# Patient Record
Sex: FEMALE | Race: WHITE | NOT HISPANIC OR LATINO | Employment: PART TIME | URBAN - METROPOLITAN AREA
[De-identification: names, ages, dates, MRNs, and addresses within clinical notes are randomized per-mention and may not be internally consistent; named-entity substitution may affect disease eponyms.]

---

## 2017-01-28 ENCOUNTER — HOSPITAL ENCOUNTER (OUTPATIENT)
Dept: LAB | Facility: MEDICAL CENTER | Age: 42
End: 2017-01-28
Attending: OBSTETRICS & GYNECOLOGY
Payer: COMMERCIAL

## 2017-01-28 ENCOUNTER — HOSPITAL ENCOUNTER (OUTPATIENT)
Dept: LAB | Facility: MEDICAL CENTER | Age: 42
End: 2017-01-28
Attending: FAMILY MEDICINE
Payer: COMMERCIAL

## 2017-01-28 DIAGNOSIS — Z00.00 LABORATORY EXAMINATION ORDERED AS PART OF A ROUTINE GENERAL MEDICAL EXAMINATION: ICD-10-CM

## 2017-01-28 LAB
25(OH)D3 SERPL-MCNC: 27 NG/ML (ref 30–100)
ALBUMIN SERPL BCP-MCNC: 4.4 G/DL (ref 3.2–4.9)
ALBUMIN/GLOB SERPL: 1.5 G/DL
ALP SERPL-CCNC: 43 U/L (ref 30–99)
ALT SERPL-CCNC: 19 U/L (ref 2–50)
ANION GAP SERPL CALC-SCNC: 8 MMOL/L (ref 0–11.9)
AST SERPL-CCNC: 18 U/L (ref 12–45)
BILIRUB SERPL-MCNC: 0.4 MG/DL (ref 0.1–1.5)
BUN SERPL-MCNC: 20 MG/DL (ref 8–22)
CALCIUM SERPL-MCNC: 9.1 MG/DL (ref 8.5–10.5)
CHLORIDE SERPL-SCNC: 106 MMOL/L (ref 96–112)
CHOLEST SERPL-MCNC: 157 MG/DL (ref 100–199)
CO2 SERPL-SCNC: 24 MMOL/L (ref 20–33)
CREAT SERPL-MCNC: 0.78 MG/DL (ref 0.5–1.4)
GLOBULIN SER CALC-MCNC: 2.9 G/DL (ref 1.9–3.5)
GLUCOSE SERPL-MCNC: 83 MG/DL (ref 65–99)
HDLC SERPL-MCNC: 72 MG/DL
LDLC SERPL CALC-MCNC: 77 MG/DL
POTASSIUM SERPL-SCNC: 4.3 MMOL/L (ref 3.6–5.5)
PROT SERPL-MCNC: 7.3 G/DL (ref 6–8.2)
SODIUM SERPL-SCNC: 138 MMOL/L (ref 135–145)
T4 FREE SERPL-MCNC: 0.76 NG/DL (ref 0.53–1.43)
TRIGL SERPL-MCNC: 38 MG/DL (ref 0–149)
TSH SERPL DL<=0.005 MIU/L-ACNC: 1.39 UIU/ML (ref 0.3–3.7)

## 2017-01-28 PROCEDURE — 82306 VITAMIN D 25 HYDROXY: CPT

## 2017-01-28 PROCEDURE — 36415 COLL VENOUS BLD VENIPUNCTURE: CPT

## 2017-01-28 PROCEDURE — 80061 LIPID PANEL: CPT

## 2017-01-28 PROCEDURE — 80053 COMPREHEN METABOLIC PANEL: CPT

## 2017-01-28 PROCEDURE — 84443 ASSAY THYROID STIM HORMONE: CPT

## 2017-01-28 PROCEDURE — 84439 ASSAY OF FREE THYROXINE: CPT

## 2017-04-17 DIAGNOSIS — J30.1 SEASONAL ALLERGIC RHINITIS DUE TO POLLEN: ICD-10-CM

## 2017-04-17 RX ORDER — MOMETASONE FUROATE 50 UG/1
SPRAY, METERED NASAL
Qty: 1 INHALER | Refills: 2 | Status: SHIPPED | OUTPATIENT
Start: 2017-04-17 | End: 2017-10-15 | Stop reason: SDUPTHER

## 2017-04-25 ENCOUNTER — OFFICE VISIT (OUTPATIENT)
Dept: MEDICAL GROUP | Facility: CLINIC | Age: 42
End: 2017-04-25
Payer: COMMERCIAL

## 2017-04-25 VITALS
HEART RATE: 74 BPM | OXYGEN SATURATION: 94 % | SYSTOLIC BLOOD PRESSURE: 120 MMHG | RESPIRATION RATE: 16 BRPM | TEMPERATURE: 97.5 F | DIASTOLIC BLOOD PRESSURE: 80 MMHG | HEIGHT: 65 IN

## 2017-04-25 DIAGNOSIS — Z12.39 SCREENING BREAST EXAMINATION: ICD-10-CM

## 2017-04-25 DIAGNOSIS — M53.3 SACRAL BACK PAIN: ICD-10-CM

## 2017-04-25 DIAGNOSIS — J30.1 SEASONAL ALLERGIC RHINITIS DUE TO POLLEN: ICD-10-CM

## 2017-04-25 DIAGNOSIS — E55.9 VITAMIN D DEFICIENCY: ICD-10-CM

## 2017-04-25 DIAGNOSIS — F51.01 PRIMARY INSOMNIA: ICD-10-CM

## 2017-04-25 DIAGNOSIS — R59.9 SHOTTY LYMPH NODES: ICD-10-CM

## 2017-04-25 PROCEDURE — 99214 OFFICE O/P EST MOD 30 MIN: CPT | Performed by: FAMILY MEDICINE

## 2017-04-25 ASSESSMENT — PATIENT HEALTH QUESTIONNAIRE - PHQ9: CLINICAL INTERPRETATION OF PHQ2 SCORE: 1

## 2017-04-25 NOTE — MR AVS SNAPSHOT
"        Hilary Santos   2017 10:00 AM   Office Visit   MRN: [id number]    Department:  [facility]   Dept Phone:  [phone]    Description:  Female : 1975   Provider:  Trang Vasquez M.D.           Reason for Visit     Follow-Up           Allergies as of 2017     Allergen Noted Reactions    Codeine 12/15/2009   Nausea    Vicodin [Hydrocodone-Acetaminophen] 12/15/2009   Nausea      You were diagnosed with     Shotty lymph nodes   [8735216]       Screening breast examination   [401320]       Primary insomnia   [841634]       Sacral back pain   [456930]       Vitamin D deficiency   [2832043]       Seasonal allergic rhinitis due to pollen   [4912601]         Vital Signs     Blood Pressure Pulse Temperature Respirations Height Oxygen Saturation    120/80 mmHg 74 36.4 °C (97.5 °F) 16 1.651 m (5' 5\") 94%    Smoking Status                   Never Smoker            Basic Information     Date Of Birth Sex Race Ethnicity Preferred Language    1975 Female Unknown Non- English      Problem List              ICD-10-CM Priority Class Noted - Resolved    Seasonal allergies J30.2   Unknown - Present    Bilateral dry eyes H04.123   Unknown - Present    Insomnia G47.00   3/25/2016 - Present    Shotty lymph nodes R59.9   2017 - Present    Sacral back pain M53.3   2017 - Present    Vitamin D deficiency E55.9   Unknown - Present      Health Maintenance        Date Due Completion Dates    MAMMOGRAM 2016    PAP SMEAR 3/24/2020 3/24/2017 (Done), 2015 (Prv Comp), 2012 (N/S)    Override on 3/24/2017: Done (Dr. Harden)    Override on 2015: Previously completed    Override on 2012: (N/S) (dr hernandez)    IMM DTaP/Tdap/Td Vaccine (2 - Td) 2025            Current Immunizations     Tdap Vaccine 2015    Tetanus Vaccine 3/1/2004      Below and/or attached are the medications your provider expects you to take. Review all of your home medications " and newly ordered medications with your provider and/or pharmacist. Follow medication instructions as directed by your provider and/or pharmacist. Please keep your medication list with you and share with your provider. Update the information when medications are discontinued, doses are changed, or new medications (including over-the-counter products) are added; and carry medication information at all times in the event of emergency situations     Allergies:  CODEINE - Nausea     VICODIN - Nausea               Medications  Valid as of: April 25, 2017 - 12:29 PM    Generic Name Brand Name Tablet Size Instructions for use    ALPRAZolam (Tab) XANAX 0.5 MG Take 1 Tab by mouth 2 times a day as needed for Anxiety.        CycloSPORINE (Emulsion) RESTASIS 0.05 %         Mometasone Furoate (Suspension) NASONEX 50 MCG/ACT USE 2 SPRAYS IN NOSTRIL EVERY DAY AS DIRECTED        Naproxen (Tab) NAPROSYN 500 MG Take 1 Tab by mouth 2 times a day, with meals.        Triamcinolone Acetonide (Ointment) KENALOG 0.1 % Apply sparingly to affected area daily        .                 Medicines prescribed today were sent to:     [id number] - [place], [place] - [address]    [address] [facility] [address]    Phone: [phone] Fax: [phone]    Open 24 Hours?: No      Medication refill instructions:       If your prescription bottle indicates you have medication refills left, it is not necessary to call your provider’s office. Please contact your pharmacy and they will refill your medication.    If your prescription bottle indicates you do not have any refills left, you may request refills at any time through one of the following ways: The online WishLink system (except Urgent Care), by calling your provider’s office, or by asking your pharmacy to contact your provider’s office with a refill request. Medication refills are processed only during regular business hours and may not be available until the next business day. Your provider may  request additional information or to have a follow-up visit with you prior to refilling your medication.   *Please Note: Medication refills are assigned a new Rx number when refilled electronically. Your pharmacy may indicate that no refills were authorized even though a new prescription for the same medication is available at the pharmacy. Please request the medicine by name with the pharmacy before contacting your provider for a refill.        Your To Do List     Future Labs/Procedures Complete By Expires    CBC WITH DIFFERENTIAL  As directed 4/25/2018    MA-SCREEN MAMMO W/CAD-BILAT  As directed 4/25/2018         [employer] Access Code: Activation code not generated  Current [employer] Status: Active

## 2017-04-26 NOTE — PROGRESS NOTES
CC: Lymph node question, health maintenance, insomnia, allergies    HPI:   Hilary presents today with the following.    1. Shotty lymph nodes  Her gynecologist was concerned with some palpable lymph nodes in her pelvic region. This is a patient with chronic allergies. She denies any pelvic pain or vaginal discharge. She denies any fever or chills. She denies any diarrhea or change in her bowel habits. She denies any visible blood in the urine or in the stool.    2. Screening breast examination  She is due    3. Primary insomnia  This has been a long-standing problem. She has switched from any prescription medication to an over-the-counter preparation with melatonin and valerian root. Discussed pros and cons. Discussed that the prescription insomnia meds are not very good and have several problems. She might as well try the over-the-counter.    4. Sacral back pain  She has continued sacral back pain which she works on with exercises and stretching. She feels this is generally in good control though at times it is problematic.    5. Vitamin D deficiency  Discussed vitamin D repletion    6. Seasonal allergic rhinitis due to pollen  She has considerable congestion today and cough. She is using over-the-counter Benadryl but it is making her a little groggy the next day. Discussed colors and cons of various over-the-counter treatments including nasal steroids.      Patient Active Problem List    Diagnosis Date Noted   • Shotty lymph nodes 04/25/2017   • Sacral back pain 04/25/2017   • Vitamin D deficiency    • Insomnia 03/25/2016   • Bilateral dry eyes    • Seasonal allergies        Current Outpatient Prescriptions   Medication Sig Dispense Refill   • mometasone (NASONEX) 50 MCG/ACT nasal spray USE 2 SPRAYS IN NOSTRIL EVERY DAY AS DIRECTED 1 Inhaler 2   • alprazolam (XANAX) 0.5 MG Tab Take 1 Tab by mouth 2 times a day as needed for Anxiety. 10 Tab 0   • naproxen (NAPROSYN) 500 MG Tab Take 1 Tab by mouth 2 times a day, with  "meals. (Patient not taking: Reported on 5/31/2016) 14 Tab 0   • RESTASIS 0.05 % ophthalmic emulsion      • triamcinolone acetonide (KENALOG) 0.1 % OINT Apply sparingly to affected area daily (Patient not taking: Reported on 4/25/2017) 30 g 6     No current facility-administered medications for this visit.         Allergies as of 04/25/2017 - Wilbert as Reviewed 04/25/2017   Allergen Reaction Noted   • Codeine Nausea 12/15/2009   • Vicodin [hydrocodone-acetaminophen] Nausea 12/15/2009        ROS: As per HPI.    /80 mmHg  Pulse 74  Temp(Src) 36.4 °C (97.5 °F)  Resp 16  Ht 1.651 m (5' 5\")  SpO2 94%    Physical Exam:  Gen:         Alert and oriented, No apparent distress.  HEENT:   EOMI, nasal mucosa is somewhat swollen and boggy, slightly pale. No ulcerations or purulence appreciated.  Neck:       She has shotty cervical nodes, most prominent anteriorly. No neck mass or thyromegaly is appreciated.   Lungs:     Clear to auscultation bilaterally  CV:          Regular rate and rhythm. No murmurs, rubs or gallops.               Ext:          No clubbing, cyanosis, edema.  Lymph exam:  No axillary adenopathy, brachial adenopathy or anterior chest adenopathy is appreciated. There is shotty adenopathy appreciated in the pelvic region bilaterally in the groin crease. These nodes range from 3-4 mm. They are mobile, not fixed and rubbery.    Assessment and Plan.   41 y.o. female with the following issues.    1. Shotty lymph nodes  I believe her enlarged nodes represent allergy. None of the nodes felt pathologic. They were all mobile. Patient will continue to check once every 2 weeks. If the areas enlarge she will let us know.    2. Screening breast examination  Order discussed and placed  - MA-SCREEN MAMMO W/CAD-BILAT; Future    3. Primary insomnia  She will continue to try various over-the-counter therapies    4. Sacral back pain  She will continue stretching and back exercises. Handout on back exercises is " supplied.    5. Vitamin D deficiency  She is taking vitamin D, 800 units daily. Discussed that this is not likely to be sufficient. Discussed increasing to at least 1000 units with meals daily. Recommend 2000 units.    6. Seasonal allergic rhinitis due to pollen  Discussed various strategies including Allegra over-the-counter, saline rinses. Orders discussed  - CBC WITH DIFFERENTIAL; Future

## 2017-07-12 ENCOUNTER — HOSPITAL ENCOUNTER (OUTPATIENT)
Dept: LAB | Facility: MEDICAL CENTER | Age: 42
End: 2017-07-12
Attending: FAMILY MEDICINE
Payer: COMMERCIAL

## 2017-07-12 DIAGNOSIS — J30.1 SEASONAL ALLERGIC RHINITIS DUE TO POLLEN: ICD-10-CM

## 2017-07-12 LAB
BASOPHILS # BLD AUTO: 1.7 % (ref 0–1.8)
BASOPHILS # BLD: 0.07 K/UL (ref 0–0.12)
EOSINOPHIL # BLD AUTO: 0.16 K/UL (ref 0–0.51)
EOSINOPHIL NFR BLD: 3.8 % (ref 0–6.9)
ERYTHROCYTE [DISTWIDTH] IN BLOOD BY AUTOMATED COUNT: 51.5 FL (ref 35.9–50)
HCT VFR BLD AUTO: 38.3 % (ref 37–47)
HGB BLD-MCNC: 12 G/DL (ref 12–16)
IMM GRANULOCYTES # BLD AUTO: 0.01 K/UL (ref 0–0.11)
IMM GRANULOCYTES NFR BLD AUTO: 0.2 % (ref 0–0.9)
LYMPHOCYTES # BLD AUTO: 1.15 K/UL (ref 1–4.8)
LYMPHOCYTES NFR BLD: 27.3 % (ref 22–41)
MCH RBC QN AUTO: 27.1 PG (ref 27–33)
MCHC RBC AUTO-ENTMCNC: 31.3 G/DL (ref 33.6–35)
MCV RBC AUTO: 86.5 FL (ref 81.4–97.8)
MONOCYTES # BLD AUTO: 0.49 K/UL (ref 0–0.85)
MONOCYTES NFR BLD AUTO: 11.6 % (ref 0–13.4)
NEUTROPHILS # BLD AUTO: 2.33 K/UL (ref 2–7.15)
NEUTROPHILS NFR BLD: 55.4 % (ref 44–72)
NRBC # BLD AUTO: 0 K/UL
NRBC BLD AUTO-RTO: 0 /100 WBC
PLATELET # BLD AUTO: 344 K/UL (ref 164–446)
PMV BLD AUTO: 11.2 FL (ref 9–12.9)
RBC # BLD AUTO: 4.43 M/UL (ref 4.2–5.4)
WBC # BLD AUTO: 4.2 K/UL (ref 4.8–10.8)

## 2017-07-12 PROCEDURE — 85025 COMPLETE CBC W/AUTO DIFF WBC: CPT

## 2017-07-12 PROCEDURE — 36415 COLL VENOUS BLD VENIPUNCTURE: CPT

## 2017-09-13 ENCOUNTER — PATIENT MESSAGE (OUTPATIENT)
Dept: MEDICAL GROUP | Facility: CLINIC | Age: 42
End: 2017-09-13

## 2017-09-13 DIAGNOSIS — F40.243 FEAR OF FLYING: ICD-10-CM

## 2017-09-13 DIAGNOSIS — F41.9 INSOMNIA SECONDARY TO ANXIETY: ICD-10-CM

## 2017-09-13 DIAGNOSIS — F51.05 INSOMNIA SECONDARY TO ANXIETY: ICD-10-CM

## 2017-09-13 RX ORDER — ALPRAZOLAM 0.5 MG/1
0.5 TABLET ORAL 2 TIMES DAILY PRN
Qty: 15 TAB | Refills: 0 | Status: SHIPPED
Start: 2017-09-13 | End: 2018-07-17 | Stop reason: SDUPTHER

## 2017-09-13 RX ORDER — ALPRAZOLAM 0.5 MG/1
0.5 TABLET ORAL 2 TIMES DAILY PRN
Qty: 10 TAB | Refills: 0 | Status: CANCELLED | OUTPATIENT
Start: 2017-09-13

## 2017-09-13 NOTE — TELEPHONE ENCOUNTER
From: Hilary aSntos  Sent: 9/13/2017 12:08 PM PDT  Subject: Medication Renewal Request    Hilary Santos would like a refill of the following medications:  alprazolam (XANAX) 0.5 MG Tab [Trang Vasquez M.D.]    Preferred pharmacy: [id number] - [place], [place] - [address]    Comment:

## 2017-09-14 NOTE — TELEPHONE ENCOUNTER
From: Hilary Santos  To: Trang Vasquez M.D.  Sent: 9/13/2017 4:10 PM PDT  Subject: alprazolam prescription    Unfortunately they have not yet received this at [facility]     ----- Message -----  From: Trang Vasquez M.D.  Sent: 9/13/17 2:08 PM  To: Hilary Santos  Subject: alprazolam prescription    i have sent that to [facility] on [address].

## 2017-10-13 ENCOUNTER — PATIENT MESSAGE (OUTPATIENT)
Dept: MEDICAL GROUP | Facility: CLINIC | Age: 42
End: 2017-10-13

## 2017-10-15 DIAGNOSIS — J30.1 SEASONAL ALLERGIC RHINITIS DUE TO POLLEN: ICD-10-CM

## 2017-10-15 RX ORDER — MOMETASONE FUROATE 50 UG/1
SPRAY, METERED NASAL
Qty: 17 G | Refills: 2 | Status: SHIPPED | OUTPATIENT
Start: 2017-10-15 | End: 2018-06-01 | Stop reason: SDUPTHER

## 2017-10-20 ENCOUNTER — PATIENT MESSAGE (OUTPATIENT)
Dept: MEDICAL GROUP | Facility: CLINIC | Age: 42
End: 2017-10-20

## 2018-06-01 DIAGNOSIS — J30.1 SEASONAL ALLERGIC RHINITIS DUE TO POLLEN: ICD-10-CM

## 2018-06-02 RX ORDER — MOMETASONE FUROATE 50 UG/1
SPRAY, METERED NASAL
Qty: 17 G | Refills: 2 | Status: SHIPPED | OUTPATIENT
Start: 2018-06-02 | End: 2019-03-28 | Stop reason: SDUPTHER

## 2018-07-17 ENCOUNTER — OFFICE VISIT (OUTPATIENT)
Dept: MEDICAL GROUP | Facility: CLINIC | Age: 43
End: 2018-07-17
Payer: COMMERCIAL

## 2018-07-17 VITALS
HEART RATE: 84 BPM | RESPIRATION RATE: 13 BRPM | OXYGEN SATURATION: 96 % | HEIGHT: 65 IN | DIASTOLIC BLOOD PRESSURE: 84 MMHG | TEMPERATURE: 99.3 F | WEIGHT: 121 LBS | SYSTOLIC BLOOD PRESSURE: 112 MMHG | BODY MASS INDEX: 20.16 KG/M2

## 2018-07-17 DIAGNOSIS — F51.05 INSOMNIA SECONDARY TO ANXIETY: ICD-10-CM

## 2018-07-17 DIAGNOSIS — D17.1 LIPOMA OF BACK: ICD-10-CM

## 2018-07-17 DIAGNOSIS — M25.511 LOCALIZED PAIN OF RIGHT SHOULDER JOINT: ICD-10-CM

## 2018-07-17 DIAGNOSIS — H04.123 BILATERAL DRY EYES: ICD-10-CM

## 2018-07-17 DIAGNOSIS — F41.9 INSOMNIA SECONDARY TO ANXIETY: ICD-10-CM

## 2018-07-17 PROCEDURE — 99213 OFFICE O/P EST LOW 20 MIN: CPT | Performed by: FAMILY MEDICINE

## 2018-07-17 RX ORDER — ALPRAZOLAM 0.5 MG/1
0.5 TABLET ORAL 2 TIMES DAILY PRN
Qty: 15 TAB | Refills: 0 | Status: SHIPPED | OUTPATIENT
Start: 2018-07-17 | End: 2018-10-19 | Stop reason: SDUPTHER

## 2018-07-17 NOTE — PROGRESS NOTES
Chief Complaint   Patient presents with   • Shoulder Pain   • Mass   • Insomnia       Subjective:     HPI:   Hilary Santos presents today with the followin. Localized pain of right shoulder joint  She has now noticed the shoulder pain off and on for about 3 months.  This became much worse approximately 2 weeks ago when she was walking her dog and the dog started to run suddenly and jerked her arm.  The pain has not subsided.  She is very physically active and has strong musculature.  She notices now that and she is working out or is grabbing for something there is pain at the top of the shoulder sometimes in the posterior shoulder.  She also notes that the shoulder is popping and shifting.  On description she points to the AC joint as 1 of the sources of pain.  Discussed obtaining an x-ray.  Discussed also obtaining an MRI but we both agree that since no surgery is contemplated and the pain and overall discomfort is not serious enough to consider that we will hold off on that for now.  Discussed that physical therapy may be quite helpful.  I think she might be pushing her tendons a little too hard.    2. Lipoma of back  When trying to assess this shoulder issue she was looking in the mirror and noticed a lump on the back.  It is very mobile and soft but she wondered if it was an issue.  On examination it is a very classic appearing lipoma.  It is approximately 4 cm, uniformly soft and very mobile.  There is no change in the overlying skin.    3. Bilateral dry eyes  Patient has a history of dry eye.  She needs a renewal on her Restasis but cannot tell me what preparation she was using.  She will call her ophthalmology office and discuss this with them.  Denies any change in vision.    4. Insomnia secondary to anxiety  Patient does have occasionally insomnia secondary to anxiety.  She is having a very difficult situation family wise.  She does use alprazolam occasionally.  Per patient's history and review  "of the [facility] board of pharmacy interface it is seen that she feels this only rarely.  A prescription for 15 tablets is written and precautions discussed.        Patient Active Problem List    Diagnosis Date Noted   • Shotty lymph nodes 04/25/2017   • Sacral back pain 04/25/2017   • Vitamin D deficiency    • Insomnia 03/25/2016   • Bilateral dry eyes    • Seasonal allergies        Current medicines (including changes today)  Current Outpatient Prescriptions   Medication Sig Dispense Refill   • ALPRAZolam (XANAX) 0.5 MG Tab Take 1 Tab by mouth 2 times a day as needed for Sleep or Anxiety for up to 30 days. 15 Tab 0   • mometasone (NASONEX) 50 MCG/ACT nasal spray INHALE 2 SPRAYS IN EACH NOSTRIL ONCE DAILY AS DIRECTED 17 g 2   • RESTASIS 0.05 % ophthalmic emulsion        No current facility-administered medications for this visit.        Allergies   Allergen Reactions   • Codeine Nausea   • Vicodin [Hydrocodone-Acetaminophen] Nausea       ROS: As per HPI       Objective:     Blood pressure 112/84, pulse 84, temperature 37.4 °C (99.3 °F), resp. rate 13, height 1.651 m (5' 5\"), weight 54.9 kg (121 lb), SpO2 96 %. Body mass index is 20.14 kg/m².    Physical Exam:  Constitutional: Well-developed and well-nourished. Not diaphoretic. No distress. Lucid and fluent.  Skin: Skin is warm and dry. No rash noted. 4 cm mobile lipoma right scapular region.  No erythema or change in overlying skin noted.  Head: Atraumatic without lesions.  Eyes: Conjunctivae and extraocular motions are normal. Pupils are equal, round, and reactive to light. No scleral icterus.   Neck: Supple, trachea midline. No thyromegaly present. No cervical or supraclavicular lymphadenopathy. No JVD or carotid bruits appreciated  Cardiovascular: Regular rate and rhythm.  Normal S1, S2 without murmur appreciated.  Chest: Effort normal. Clear to auscultation throughout. No adventitious sounds.   Extremities: No cyanosis, clubbing, erythema, nor edema.  Right " shoulder some tenderness at the AC junction.  Some mild tenderness posteriorly.  No edema or erythema appreciated.  On motion of the shoulder there is certainly a popping and shifting sensation which is abnormal.  Neurological: Alert and oriented x 3.  Gait is normal.  Movements are fluent and symmetric and strong.  Psychiatric:  Behavior, mood, and affect are appropriate.       Assessment and Plan:     43 y.o. female with the following issues:    1. Localized pain of right shoulder joint  DX-SHOULDER 2+ RIGHT    REFERRAL TO PHYSICAL THERAPY Reason for Therapy: Eval/Treat/Report   2. Lipoma of back     3. Bilateral dry eyes     4. Insomnia secondary to anxiety  ALPRAZolam (XANAX) 0.5 MG Tab         Followup: Return if symptoms worsen or fail to improve.

## 2018-10-19 DIAGNOSIS — F51.05 INSOMNIA SECONDARY TO ANXIETY: ICD-10-CM

## 2018-10-19 DIAGNOSIS — F41.9 INSOMNIA SECONDARY TO ANXIETY: ICD-10-CM

## 2018-10-19 RX ORDER — ALPRAZOLAM 0.5 MG/1
0.5 TABLET ORAL 2 TIMES DAILY PRN
Qty: 10 TAB | Refills: 0 | Status: SHIPPED
Start: 2018-10-19 | End: 2018-10-22

## 2018-10-22 DIAGNOSIS — F40.243 FEAR OF FLYING: ICD-10-CM

## 2018-10-22 DIAGNOSIS — F41.8 SITUATIONAL ANXIETY: ICD-10-CM

## 2018-10-22 DIAGNOSIS — F51.04 PSYCHOPHYSIOLOGIC INSOMNIA: ICD-10-CM

## 2018-10-22 RX ORDER — ALPRAZOLAM 0.5 MG/1
0.5 TABLET ORAL 2 TIMES DAILY PRN
Qty: 25 TAB | Refills: 0 | Status: SHIPPED
Start: 2018-10-22 | End: 2018-11-21

## 2019-01-09 ENCOUNTER — NON-PROVIDER VISIT (OUTPATIENT)
Dept: MEDICAL GROUP | Facility: MEDICAL CENTER | Age: 44
End: 2019-01-09
Payer: COMMERCIAL

## 2019-01-09 DIAGNOSIS — Z23 NEED FOR IMMUNIZATION AGAINST INFLUENZA: ICD-10-CM

## 2019-01-09 PROCEDURE — 90686 IIV4 VACC NO PRSV 0.5 ML IM: CPT | Performed by: FAMILY MEDICINE

## 2019-01-09 PROCEDURE — 90471 IMMUNIZATION ADMIN: CPT | Performed by: FAMILY MEDICINE

## 2019-01-09 NOTE — NON-PROVIDER
"Hilary Santos is a 43 y.o. female here for a non-provider visit for:   FLU    Reason for immunization: Annual Flu Vaccine  Immunization records indicate need for vaccine: Yes, confirmed with Epic  Minimum interval has been met for this vaccine: Yes  ABN completed: No    Order and dose verified by:   VIS Dated  [id number] was given to patient: Yes  All IAC Questionnaire questions were answered \"No.\"    Patient tolerated injection and no adverse effects were observed or reported: Yes    Pt scheduled for next dose in series: No    "

## 2019-03-28 DIAGNOSIS — J30.1 SEASONAL ALLERGIC RHINITIS DUE TO POLLEN: ICD-10-CM

## 2019-03-28 RX ORDER — MOMETASONE FUROATE 50 UG/1
SPRAY, METERED NASAL
Qty: 17 G | Refills: 2 | Status: SHIPPED | OUTPATIENT
Start: 2019-03-28 | End: 2019-10-29 | Stop reason: SDUPTHER

## 2019-04-26 ENCOUNTER — OFFICE VISIT (OUTPATIENT)
Dept: MEDICAL GROUP | Facility: MEDICAL CENTER | Age: 44
End: 2019-04-26
Payer: COMMERCIAL

## 2019-04-26 ENCOUNTER — HOSPITAL ENCOUNTER (OUTPATIENT)
Dept: LAB | Facility: MEDICAL CENTER | Age: 44
End: 2019-04-26
Attending: FAMILY MEDICINE
Payer: COMMERCIAL

## 2019-04-26 VITALS
DIASTOLIC BLOOD PRESSURE: 84 MMHG | SYSTOLIC BLOOD PRESSURE: 124 MMHG | HEIGHT: 65 IN | OXYGEN SATURATION: 100 % | RESPIRATION RATE: 16 BRPM | TEMPERATURE: 98.6 F | HEART RATE: 66 BPM | BODY MASS INDEX: 22.33 KG/M2 | WEIGHT: 134 LBS

## 2019-04-26 DIAGNOSIS — R59.9 SHOTTY LYMPH NODES: ICD-10-CM

## 2019-04-26 DIAGNOSIS — Z20.828 EXPOSURE TO MONONUCLEOSIS SYNDROME: ICD-10-CM

## 2019-04-26 DIAGNOSIS — R19.7 DIARRHEA IN ADULT PATIENT: ICD-10-CM

## 2019-04-26 DIAGNOSIS — G56.03 CARPAL TUNNEL SYNDROME ON BOTH SIDES: ICD-10-CM

## 2019-04-26 DIAGNOSIS — R14.0 ABDOMINAL BLOATING: ICD-10-CM

## 2019-04-26 DIAGNOSIS — Z91.018 MULTIPLE FOOD ALLERGIES: ICD-10-CM

## 2019-04-26 DIAGNOSIS — Z12.39 BREAST CANCER SCREENING: ICD-10-CM

## 2019-04-26 DIAGNOSIS — H91.93 BILATERAL HEARING LOSS, UNSPECIFIED HEARING LOSS TYPE: ICD-10-CM

## 2019-04-26 LAB
ALBUMIN SERPL BCP-MCNC: 4.1 G/DL (ref 3.2–4.9)
ALBUMIN/GLOB SERPL: 1.5 G/DL
ALP SERPL-CCNC: 62 U/L (ref 30–99)
ALT SERPL-CCNC: 20 U/L (ref 2–50)
ANION GAP SERPL CALC-SCNC: 5 MMOL/L (ref 0–11.9)
AST SERPL-CCNC: 24 U/L (ref 12–45)
BASOPHILS # BLD AUTO: 1.9 % (ref 0–1.8)
BASOPHILS # BLD: 0.1 K/UL (ref 0–0.12)
BILIRUB SERPL-MCNC: 0.2 MG/DL (ref 0.1–1.5)
BUN SERPL-MCNC: 13 MG/DL (ref 8–22)
CALCIUM SERPL-MCNC: 9.1 MG/DL (ref 8.5–10.5)
CHLORIDE SERPL-SCNC: 106 MMOL/L (ref 96–112)
CO2 SERPL-SCNC: 26 MMOL/L (ref 20–33)
COMMENT 1642: NORMAL
CREAT SERPL-MCNC: 0.81 MG/DL (ref 0.5–1.4)
EOSINOPHIL # BLD AUTO: 0.06 K/UL (ref 0–0.51)
EOSINOPHIL NFR BLD: 1.1 % (ref 0–6.9)
ERYTHROCYTE [DISTWIDTH] IN BLOOD BY AUTOMATED COUNT: 53.3 FL (ref 35.9–50)
GLOBULIN SER CALC-MCNC: 2.7 G/DL (ref 1.9–3.5)
GLUCOSE SERPL-MCNC: 100 MG/DL (ref 65–99)
HCT VFR BLD AUTO: 35.8 % (ref 37–47)
HETEROPH AB SER QL: NEGATIVE
HGB BLD-MCNC: 10.7 G/DL (ref 12–16)
IMM GRANULOCYTES # BLD AUTO: 0.01 K/UL (ref 0–0.11)
IMM GRANULOCYTES NFR BLD AUTO: 0.2 % (ref 0–0.9)
LYMPHOCYTES # BLD AUTO: 1.51 K/UL (ref 1–4.8)
LYMPHOCYTES NFR BLD: 28.3 % (ref 22–41)
MCH RBC QN AUTO: 24.9 PG (ref 27–33)
MCHC RBC AUTO-ENTMCNC: 29.9 G/DL (ref 33.6–35)
MCV RBC AUTO: 83.4 FL (ref 81.4–97.8)
MONOCYTES # BLD AUTO: 0.58 K/UL (ref 0–0.85)
MONOCYTES NFR BLD AUTO: 10.9 % (ref 0–13.4)
MORPHOLOGY BLD-IMP: NORMAL
NEUTROPHILS # BLD AUTO: 3.07 K/UL (ref 2–7.15)
NEUTROPHILS NFR BLD: 57.6 % (ref 44–72)
NRBC # BLD AUTO: 0 K/UL
NRBC BLD-RTO: 0 /100 WBC
OVALOCYTES BLD QL SMEAR: NORMAL
PLATELET # BLD AUTO: 391 K/UL (ref 164–446)
PLATELET BLD QL SMEAR: NORMAL
PMV BLD AUTO: 9.9 FL (ref 9–12.9)
POIKILOCYTOSIS BLD QL SMEAR: NORMAL
POTASSIUM SERPL-SCNC: 3.8 MMOL/L (ref 3.6–5.5)
PROT SERPL-MCNC: 6.8 G/DL (ref 6–8.2)
RBC # BLD AUTO: 4.29 M/UL (ref 4.2–5.4)
RBC BLD AUTO: PRESENT
SODIUM SERPL-SCNC: 137 MMOL/L (ref 135–145)
WBC # BLD AUTO: 5.3 K/UL (ref 4.8–10.8)

## 2019-04-26 PROCEDURE — 36415 COLL VENOUS BLD VENIPUNCTURE: CPT

## 2019-04-26 PROCEDURE — 86308 HETEROPHILE ANTIBODY SCREEN: CPT

## 2019-04-26 PROCEDURE — 80053 COMPREHEN METABOLIC PANEL: CPT

## 2019-04-26 PROCEDURE — 99214 OFFICE O/P EST MOD 30 MIN: CPT | Performed by: FAMILY MEDICINE

## 2019-04-26 PROCEDURE — 85025 COMPLETE CBC W/AUTO DIFF WBC: CPT

## 2019-04-26 ASSESSMENT — PATIENT HEALTH QUESTIONNAIRE - PHQ9: CLINICAL INTERPRETATION OF PHQ2 SCORE: 0

## 2019-04-26 NOTE — PROGRESS NOTES
Chief Complaint   Patient presents with   • Hearing Problem   • Wrist Pain   • Bloated   • Diarrhea       Subjective:     HPI:   Hilary Santos presents today with the followin. Bilateral hearing loss, unspecified hearing loss type  Patient had significant noise exposure in her 20s.  She has noted diminished hearing.  Her family notes this too.  Referral for evaluation and treatment discussed and placed    2. Breast cancer screening  Discussed, mammogram order placed    3. Carpal tunnel syndrome on both sides  Patient is having typical symptoms, worse on the right.  They are worse at the end of a days work and at night.  Discussed options.  She would like to avoid surgery.  Discussed starting with soft carpal tunnel braces at night.    4. Multiple food allergies  Patient has tried to identfiy food allergies that are giving her abdominal problems.  Finds she does have problems with lactose and gluten but has not been able to identify other.  Discussed a trial of the whole 30 diet for at least 6 weeks.  Says she will do that.    5. Abdominal bloating/Diarrhea in adult patient  She has abdominal bloating and urgent diarrhea, worse with stress but sometimes unprovoked.  Denies blood in the stool.  GI referral discussed and placed and this has been going on several years.  Lab orders discussed and placed    7. Shotty lymph nodes  Patient has allergies and palpable shotty nodes.  Lab orders discussed and placed    8. Exposure to mononucleosis syndrome   diagnosed with mono with confirmatory testing.  She is feeling generally unwell.  Lab orders discussed and placed.        Patient Active Problem List    Diagnosis Date Noted   • Carpal tunnel syndrome on both sides 2019   • Situational anxiety 10/22/2018   • Fear of flying 10/22/2018   • Shotty lymph nodes 2017   • Sacral back pain 2017   • Vitamin D deficiency    • Psychophysiologic insomnia 2016   • Bilateral dry eyes    •  "Seasonal allergies        Current medicines (including changes today)  Current Outpatient Prescriptions   Medication Sig Dispense Refill   • mometasone (NASONEX) 50 MCG/ACT nasal spray INHALE 2 SPRAYS IN EACH NOSTRIL ONCE DAILY AS DIRECTED 17 g 2   • RESTASIS 0.05 % ophthalmic emulsion        No current facility-administered medications for this visit.        Allergies   Allergen Reactions   • Codeine Nausea   • Vicodin [Hydrocodone-Acetaminophen] Nausea       ROS: As per HPI       Objective:     /84   Pulse 66   Temp 37 °C (98.6 °F)   Resp 16   Ht 1.651 m (5' 5\")   Wt 60.8 kg (134 lb)   SpO2 100%  Body mass index is 22.3 kg/m².    Physical Exam:  Constitutional: Well-developed and well-nourished. Not diaphoretic. No distress. Lucid and fluent.  Skin: Skin is warm and dry. No rash noted.  Head: Atraumatic without lesions.  Eyes: Conjunctivae and extraocular motions are normal. Pupils are equal, round, and reactive to light. No scleral icterus.   Ears:  External ears unremarkable. Tympanic membranes clear and intact.  Nose: Nares patent. Mucosa without edema or erythema. No discharge. No facial tenderness.  Mouth/Throat: Tongue normal. Oropharynx is clear and moist. Posterior pharynx without erythema or exudates.  Neck: Supple, trachea midline. No thyromegaly present. No cervical or supraclavicular lymphadenopathy. No JVD or carotid bruits appreciated  Cardiovascular: Regular rate and rhythm.  Normal S1, S2 without murmur appreciated.  Chest: Effort normal. Clear to auscultation throughout. No adventitious sounds.   Abdomen: Soft, epigastrium and sigmoid region tender, and without distention. Active bowel sounds in all four quadrants. No rebound, guarding, masses or hepatosplenomegaly.  Extremities: No cyanosis, clubbing, erythema, nor edema.   Neurological: Alert and oriented x 3. DTRs 2+/3 and symmetric.  Psychiatric:  Behavior, mood, and affect are appropriate.       Assessment and Plan:     43 y.o. " female with the following issues:    1. Bilateral hearing loss, unspecified hearing loss type  REFERRAL TO ENT   2. Breast cancer screening  MA-SCREENING MAMMO BILAT W/TOMOSYNTHESIS W/CAD   3. Carpal tunnel syndrome on both sides     4. Multiple food allergies     5. Abdominal bloating  REFERRAL TO GASTROENTEROLOGY    CBC WITH DIFFERENTIAL    MONONUCLEOSIS TEST QUAL    Comp Metabolic Panel   6. Diarrhea in adult patient  REFERRAL TO GASTROENTEROLOGY    CBC WITH DIFFERENTIAL    Comp Metabolic Panel   7. Shotty lymph nodes  CBC WITH DIFFERENTIAL    MONONUCLEOSIS TEST QUAL   8. Exposure to mononucleosis syndrome  MONONUCLEOSIS TEST QUAL         Followup: Return in about 4 months (around 8/26/2019), or if symptoms worsen or fail to improve.

## 2019-04-30 ENCOUNTER — TELEPHONE (OUTPATIENT)
Dept: MEDICAL GROUP | Facility: MEDICAL CENTER | Age: 44
End: 2019-04-30

## 2019-05-01 ENCOUNTER — TELEPHONE (OUTPATIENT)
Dept: MEDICAL GROUP | Facility: MEDICAL CENTER | Age: 44
End: 2019-05-01

## 2019-05-01 NOTE — TELEPHONE ENCOUNTER
The primary result is actually anemia.  The rest of the test results are not particularly significant.  Needs to start iron supplementation.  We should also recheck the blood count after 2 months.

## 2019-07-15 ENCOUNTER — HOSPITAL ENCOUNTER (OUTPATIENT)
Dept: RADIOLOGY | Facility: MEDICAL CENTER | Age: 44
End: 2019-07-15
Attending: FAMILY MEDICINE
Payer: COMMERCIAL

## 2019-07-15 DIAGNOSIS — Z12.39 BREAST CANCER SCREENING: ICD-10-CM

## 2019-07-15 PROCEDURE — 77063 BREAST TOMOSYNTHESIS BI: CPT

## 2019-07-25 DIAGNOSIS — D64.9 ANEMIA, UNSPECIFIED TYPE: ICD-10-CM

## 2019-07-25 NOTE — PROGRESS NOTES
Lab orders placed to follow-up on anemia.  We do not have a causation though iron deficiency has been assumed.  I am included an iron and B12 level

## 2019-07-29 ENCOUNTER — HOSPITAL ENCOUNTER (OUTPATIENT)
Dept: LAB | Facility: MEDICAL CENTER | Age: 44
End: 2019-07-29
Attending: FAMILY MEDICINE
Payer: COMMERCIAL

## 2019-07-29 DIAGNOSIS — D64.9 ANEMIA, UNSPECIFIED TYPE: ICD-10-CM

## 2019-07-29 LAB
ERYTHROCYTE [DISTWIDTH] IN BLOOD BY AUTOMATED COUNT: 60.3 FL (ref 35.9–50)
HCT VFR BLD AUTO: 43.1 % (ref 37–47)
HGB BLD-MCNC: 14 G/DL (ref 12–16)
IRON SATN MFR SERPL: 15 % (ref 15–55)
IRON SERPL-MCNC: 59 UG/DL (ref 40–170)
MCH RBC QN AUTO: 30.6 PG (ref 27–33)
MCHC RBC AUTO-ENTMCNC: 32.5 G/DL (ref 33.6–35)
MCV RBC AUTO: 94.3 FL (ref 81.4–97.8)
PLATELET # BLD AUTO: 242 K/UL (ref 164–446)
PMV BLD AUTO: 11.2 FL (ref 9–12.9)
RBC # BLD AUTO: 4.57 M/UL (ref 4.2–5.4)
TIBC SERPL-MCNC: 386 UG/DL (ref 250–450)
WBC # BLD AUTO: 5 K/UL (ref 4.8–10.8)

## 2019-07-29 PROCEDURE — 36415 COLL VENOUS BLD VENIPUNCTURE: CPT

## 2019-07-29 PROCEDURE — 83540 ASSAY OF IRON: CPT

## 2019-07-29 PROCEDURE — 85027 COMPLETE CBC AUTOMATED: CPT

## 2019-07-29 PROCEDURE — 82607 VITAMIN B-12: CPT

## 2019-07-29 PROCEDURE — 83550 IRON BINDING TEST: CPT

## 2019-07-30 LAB — VIT B12 SERPL-MCNC: 515 PG/ML (ref 211–911)

## 2019-10-29 DIAGNOSIS — J30.1 SEASONAL ALLERGIC RHINITIS DUE TO POLLEN: ICD-10-CM

## 2019-10-29 RX ORDER — MOMETASONE FUROATE 50 UG/1
SPRAY, METERED NASAL
Qty: 17 G | Refills: 2 | Status: SHIPPED | OUTPATIENT
Start: 2019-10-29 | End: 2020-08-31

## 2019-12-13 ENCOUNTER — NON-PROVIDER VISIT (OUTPATIENT)
Dept: MEDICAL GROUP | Facility: MEDICAL CENTER | Age: 44
End: 2019-12-13
Payer: COMMERCIAL

## 2019-12-13 DIAGNOSIS — Z23 NEED FOR VACCINATION: ICD-10-CM

## 2019-12-13 PROCEDURE — 90471 IMMUNIZATION ADMIN: CPT | Performed by: FAMILY MEDICINE

## 2019-12-13 PROCEDURE — 90686 IIV4 VACC NO PRSV 0.5 ML IM: CPT | Performed by: FAMILY MEDICINE

## 2019-12-14 NOTE — NON-PROVIDER
"Hilary Santos is a 44 y.o. female here for a non-provider visit for:   FLU    Reason for immunization: Annual Flu Vaccine  Immunization records indicate need for vaccine: Yes, confirmed with Epic  Minimum interval has been met for this vaccine: Yes  ABN completed: Yes    Order and dose verified by: KATIA  VIS Dated  8/15/19 was given to patient: Yes  All IAC Questionnaire questions were answered \"No.\"    Patient tolerated injection and no adverse effects were observed or reported: Yes    Pt scheduled for next dose in series: No        "

## 2020-07-22 DIAGNOSIS — R14.0 ABDOMINAL BLOATING: ICD-10-CM

## 2020-07-22 DIAGNOSIS — E55.9 VITAMIN D DEFICIENCY: ICD-10-CM

## 2020-07-22 DIAGNOSIS — Z13.220 SCREENING CHOLESTEROL LEVEL: ICD-10-CM

## 2020-07-22 DIAGNOSIS — R73.09 ELEVATED GLUCOSE: ICD-10-CM

## 2020-07-22 DIAGNOSIS — D64.9 ANEMIA, UNSPECIFIED TYPE: ICD-10-CM

## 2020-07-22 DIAGNOSIS — N92.0 MENORRHAGIA WITH REGULAR CYCLE: ICD-10-CM

## 2020-07-27 ENCOUNTER — HOSPITAL ENCOUNTER (OUTPATIENT)
Dept: LAB | Facility: MEDICAL CENTER | Age: 45
End: 2020-07-27
Attending: FAMILY MEDICINE
Payer: COMMERCIAL

## 2020-07-27 DIAGNOSIS — R14.0 ABDOMINAL BLOATING: ICD-10-CM

## 2020-07-27 DIAGNOSIS — E55.9 VITAMIN D DEFICIENCY: ICD-10-CM

## 2020-07-27 DIAGNOSIS — R73.09 ELEVATED GLUCOSE: ICD-10-CM

## 2020-07-27 DIAGNOSIS — N92.0 MENORRHAGIA WITH REGULAR CYCLE: ICD-10-CM

## 2020-07-27 DIAGNOSIS — Z13.220 SCREENING CHOLESTEROL LEVEL: ICD-10-CM

## 2020-07-27 LAB
25(OH)D3 SERPL-MCNC: 38 NG/ML (ref 30–100)
ALBUMIN SERPL BCP-MCNC: 4.4 G/DL (ref 3.2–4.9)
ALBUMIN/GLOB SERPL: 1.6 G/DL
ALP SERPL-CCNC: 60 U/L (ref 30–99)
ALT SERPL-CCNC: 10 U/L (ref 2–50)
ANION GAP SERPL CALC-SCNC: 12 MMOL/L (ref 7–16)
AST SERPL-CCNC: 20 U/L (ref 12–45)
BILIRUB SERPL-MCNC: 0.4 MG/DL (ref 0.1–1.5)
BUN SERPL-MCNC: 11 MG/DL (ref 8–22)
CALCIUM SERPL-MCNC: 9 MG/DL (ref 8.5–10.5)
CHLORIDE SERPL-SCNC: 106 MMOL/L (ref 96–112)
CHOLEST SERPL-MCNC: 151 MG/DL (ref 100–199)
CO2 SERPL-SCNC: 21 MMOL/L (ref 20–33)
CREAT SERPL-MCNC: 0.71 MG/DL (ref 0.5–1.4)
ERYTHROCYTE [DISTWIDTH] IN BLOOD BY AUTOMATED COUNT: 50.8 FL (ref 35.9–50)
EST. AVERAGE GLUCOSE BLD GHB EST-MCNC: 111 MG/DL
FASTING STATUS PATIENT QL REPORTED: NORMAL
GLOBULIN SER CALC-MCNC: 2.7 G/DL (ref 1.9–3.5)
GLUCOSE SERPL-MCNC: 93 MG/DL (ref 65–99)
HBA1C MFR BLD: 5.5 % (ref 0–5.6)
HCT VFR BLD AUTO: 34 % (ref 37–47)
HDLC SERPL-MCNC: 77 MG/DL
HGB BLD-MCNC: 10.3 G/DL (ref 12–16)
LDLC SERPL CALC-MCNC: 68 MG/DL
MCH RBC QN AUTO: 25 PG (ref 27–33)
MCHC RBC AUTO-ENTMCNC: 30.3 G/DL (ref 33.6–35)
MCV RBC AUTO: 82.5 FL (ref 81.4–97.8)
PLATELET # BLD AUTO: 373 K/UL (ref 164–446)
PMV BLD AUTO: 10.4 FL (ref 9–12.9)
POTASSIUM SERPL-SCNC: 4.7 MMOL/L (ref 3.6–5.5)
PROT SERPL-MCNC: 7.1 G/DL (ref 6–8.2)
RBC # BLD AUTO: 4.12 M/UL (ref 4.2–5.4)
SODIUM SERPL-SCNC: 139 MMOL/L (ref 135–145)
TRIGL SERPL-MCNC: 32 MG/DL (ref 0–149)
WBC # BLD AUTO: 4 K/UL (ref 4.8–10.8)

## 2020-07-27 PROCEDURE — 80053 COMPREHEN METABOLIC PANEL: CPT

## 2020-07-27 PROCEDURE — 82306 VITAMIN D 25 HYDROXY: CPT

## 2020-07-27 PROCEDURE — 36415 COLL VENOUS BLD VENIPUNCTURE: CPT

## 2020-07-27 PROCEDURE — 85027 COMPLETE CBC AUTOMATED: CPT

## 2020-07-27 PROCEDURE — 80061 LIPID PANEL: CPT

## 2020-07-27 PROCEDURE — 83036 HEMOGLOBIN GLYCOSYLATED A1C: CPT

## 2020-07-30 ENCOUNTER — TELEMEDICINE (OUTPATIENT)
Dept: MEDICAL GROUP | Facility: MEDICAL CENTER | Age: 45
End: 2020-07-30
Payer: COMMERCIAL

## 2020-07-30 VITALS
SYSTOLIC BLOOD PRESSURE: 138 MMHG | RESPIRATION RATE: 16 BRPM | HEART RATE: 58 BPM | BODY MASS INDEX: 22.99 KG/M2 | TEMPERATURE: 97.6 F | DIASTOLIC BLOOD PRESSURE: 82 MMHG | HEIGHT: 65 IN | WEIGHT: 138 LBS

## 2020-07-30 DIAGNOSIS — R14.0 ABDOMINAL BLOATING: ICD-10-CM

## 2020-07-30 DIAGNOSIS — R19.7 DIARRHEA IN ADULT PATIENT: ICD-10-CM

## 2020-07-30 DIAGNOSIS — D50.0 BLOOD LOSS ANEMIA: ICD-10-CM

## 2020-07-30 DIAGNOSIS — N92.0 MENORRHAGIA WITH REGULAR CYCLE: ICD-10-CM

## 2020-07-30 DIAGNOSIS — Z12.39 SCREENING BREAST EXAMINATION: ICD-10-CM

## 2020-07-30 PROCEDURE — 99214 OFFICE O/P EST MOD 30 MIN: CPT | Mod: 95,CR | Performed by: FAMILY MEDICINE

## 2020-07-30 ASSESSMENT — FIBROSIS 4 INDEX: FIB4 SCORE: 0.75

## 2020-07-30 NOTE — PROGRESS NOTES
This encounter was conducted via Zoom meeting  Verbal consent was obtained. Patient's identity was verified.       CC:irregular bleeding, anemia                                                                                                                               HPI:   Hilary presents today with the following.    1. Menorrhagia with regular cycle  Patient is having generally regular cycles but her bleeding has been significant and excessive.  She is taking some iron intermittently but is feeling very tired when she is having her menses.  She is due for a Pap soon.  Discussed seeing gynecology for her Pap and also to discuss her heavy bleeding.  Referral is placed.    2. Blood loss anemia  No other sources of blood loss noted.  Denies visible blood in stool or urine.  In the past iron supplementation helped in the past    3. Screening breast examination  Mammogram order discussed and placed    4. Diarrhea in adult patient  She does continue to have loose urgent stool and abdominal bloating.  Referral was placed in the past but the pandemic has intervened.  Referral is renewed.  Denies any visible blood.  - REFERRAL TO GASTROENTEROLOGY    5. Abdominal bloating  Patient is having episodic bloating, often triggered by food.  She has been told in the past this is likely IBS.  She does need further evaluation and treatment with gastroenterology.  Referral is renewed.  - REFERRAL TO GASTROENTEROLOGY      Patient Active Problem List    Diagnosis Date Noted   • Carpal tunnel syndrome on both sides 04/26/2019   • Situational anxiety 10/22/2018   • Fear of flying 10/22/2018   • Shotty lymph nodes 04/25/2017   • Sacral back pain 04/25/2017   • Vitamin D deficiency    • Psychophysiologic insomnia 03/25/2016   • Bilateral dry eyes    • Seasonal allergies        Current Outpatient Medications   Medication Sig Dispense Refill   • mometasone (NASONEX) 50 MCG/ACT nasal spray INHALE 2 SPRAYS IN EACH NOSTRIL ONCE DAILY AS  "DIRECTED 17 g 2   • RESTASIS 0.05 % ophthalmic emulsion        No current facility-administered medications for this visit.          Allergies as of 07/30/2020 - Reviewed 07/30/2020   Allergen Reaction Noted   • Codeine Nausea 12/15/2009   • Vicodin [hydrocodone-acetaminophen] Nausea 12/15/2009        ROS:  Denies, chest pain, Shortness of breath, Edema.     /82   Pulse (!) 58   Temp 36.4 °C (97.6 °F)   Resp 16   Ht 1.651 m (5' 5\")   Wt 62.6 kg (138 lb)   BMI 22.96 kg/m²       Physical Exam:  Constitutional: Alert, no distress, well-groomed.  Skin: No rashes in visible areas.  Eye: Round. Conjunctiva clear, No icterus.   ENMT: Lips pink without lesions, good dentition, moist mucous membranes. Phonation normal.  Neck: No masses, no thyromegaly. Moves freely without pain.  CV: Pulse as reported by patient  Respiratory: Unlabored respiratory effort, no cough or audible wheeze  Psych: Alert and oriented x3, normal affect and mood.      Lab results reviewed and discussed.  She is anemic.      Assessment and Plan.   45 y.o. female with the following issues.    1. Menorrhagia with regular cycle  Patient has heavy though regular menstrual cycles.  Patient has been attributing this to being perimenopausal but I do believe we need an ultrasound to determine whether there is a structural change or thickened endometrium.  She agrees to this testing.  She also agrees to referral to OB/GYN.  She is due for her Pap as well.  - US-PELVIC TRANSVAGINAL ONLY; Future  - REFERRAL TO OB/GYN    2. Blood loss anemia  No other source has been found for her anemia.  No visible blood in stool or urine.  No epistaxis.  No hematemesis.  Ultrasound order discussed and placed.  The anemia is likely due to the blood loss associated with her heavy periods  - US-PELVIC TRANSVAGINAL ONLY; Future  - IRON/TOTAL IRON BIND; Future  - REFERRAL TO OB/GYN    3. Screening breast examination  Mammogram order discussed and placed  - MA DIAGNOSTIC " MAMMO BILAT W/CAD; Future    4. Diarrhea in adult patient  Referral discussed and placed  - REFERRAL TO GASTROENTEROLOGY    5. Abdominal bloating  Referral discussed and placed  - REFERRAL TO GASTROENTEROLOGY    Follow up 6 weeks, sooner as needed

## 2020-08-30 DIAGNOSIS — J30.1 SEASONAL ALLERGIC RHINITIS DUE TO POLLEN: ICD-10-CM

## 2020-08-31 ENCOUNTER — HOSPITAL ENCOUNTER (OUTPATIENT)
Dept: RADIOLOGY | Facility: MEDICAL CENTER | Age: 45
End: 2020-08-31
Attending: FAMILY MEDICINE
Payer: COMMERCIAL

## 2020-08-31 DIAGNOSIS — D50.0 BLOOD LOSS ANEMIA: ICD-10-CM

## 2020-08-31 DIAGNOSIS — N92.0 MENORRHAGIA WITH REGULAR CYCLE: ICD-10-CM

## 2020-08-31 PROCEDURE — 76830 TRANSVAGINAL US NON-OB: CPT

## 2020-08-31 RX ORDER — MOMETASONE FUROATE 50 UG/1
SPRAY, METERED NASAL
Qty: 17 G | Refills: 2 | Status: SHIPPED | OUTPATIENT
Start: 2020-08-31 | End: 2021-02-25

## 2020-10-06 DIAGNOSIS — N95.1 PERIMENOPAUSAL SYMPTOMS: ICD-10-CM

## 2020-10-08 ENCOUNTER — HOSPITAL ENCOUNTER (OUTPATIENT)
Dept: LAB | Facility: MEDICAL CENTER | Age: 45
End: 2020-10-08
Attending: INTERNAL MEDICINE
Payer: COMMERCIAL

## 2020-10-08 LAB
FERRITIN SERPL-MCNC: 4.7 NG/ML (ref 10–291)
IRON SATN MFR SERPL: 6 % (ref 15–55)
IRON SERPL-MCNC: 27 UG/DL (ref 40–170)
TIBC SERPL-MCNC: 418 UG/DL (ref 250–450)
UIBC SERPL-MCNC: 391 UG/DL (ref 110–370)

## 2020-10-08 PROCEDURE — 82728 ASSAY OF FERRITIN: CPT

## 2020-10-08 PROCEDURE — 83540 ASSAY OF IRON: CPT

## 2020-10-08 PROCEDURE — 36415 COLL VENOUS BLD VENIPUNCTURE: CPT

## 2020-10-08 PROCEDURE — 83550 IRON BINDING TEST: CPT

## 2021-01-25 DIAGNOSIS — R19.7 DIARRHEA IN ADULT PATIENT: ICD-10-CM

## 2021-01-25 DIAGNOSIS — G56.03 CARPAL TUNNEL SYNDROME ON BOTH SIDES: ICD-10-CM

## 2021-01-25 DIAGNOSIS — N95.1 PERIMENOPAUSAL SYMPTOMS: ICD-10-CM

## 2021-01-25 NOTE — PROGRESS NOTES
TPO antibody test has been recommended.  She is working with holistic practitioner who has recommended an iodine treatment called Amanda.  I have not familiar with that but a TPO antibody test is recommended.  I think that is not unreasonable and I have placed that order.  Patient is having increasing symptoms some of which might be related to her menopause.

## 2021-01-26 ENCOUNTER — HOSPITAL ENCOUNTER (OUTPATIENT)
Dept: LAB | Facility: MEDICAL CENTER | Age: 46
End: 2021-01-26
Attending: FAMILY MEDICINE
Payer: COMMERCIAL

## 2021-01-26 ENCOUNTER — HOSPITAL ENCOUNTER (OUTPATIENT)
Dept: LAB | Facility: MEDICAL CENTER | Age: 46
End: 2021-01-26
Attending: INTERNAL MEDICINE
Payer: COMMERCIAL

## 2021-01-26 DIAGNOSIS — N95.1 PERIMENOPAUSAL SYMPTOMS: ICD-10-CM

## 2021-01-26 DIAGNOSIS — G56.03 CARPAL TUNNEL SYNDROME ON BOTH SIDES: ICD-10-CM

## 2021-01-26 DIAGNOSIS — D50.0 BLOOD LOSS ANEMIA: ICD-10-CM

## 2021-01-26 DIAGNOSIS — R19.7 DIARRHEA IN ADULT PATIENT: ICD-10-CM

## 2021-01-26 LAB
BASOPHILS # BLD AUTO: 2 % (ref 0–1.8)
BASOPHILS # BLD: 0.06 K/UL (ref 0–0.12)
EOSINOPHIL # BLD AUTO: 0.06 K/UL (ref 0–0.51)
EOSINOPHIL NFR BLD: 2 % (ref 0–6.9)
ERYTHROCYTE [DISTWIDTH] IN BLOOD BY AUTOMATED COUNT: 58.8 FL (ref 35.9–50)
FERRITIN SERPL-MCNC: 5 NG/ML (ref 10–291)
HCT VFR BLD AUTO: 40.8 % (ref 37–47)
HGB BLD-MCNC: 13 G/DL (ref 12–16)
IMM GRANULOCYTES # BLD AUTO: 0.01 K/UL (ref 0–0.11)
IMM GRANULOCYTES NFR BLD AUTO: 0.3 % (ref 0–0.9)
IRON SATN MFR SERPL: 6 % (ref 15–55)
IRON SATN MFR SERPL: 7 % (ref 15–55)
IRON SERPL-MCNC: 25 UG/DL (ref 40–170)
IRON SERPL-MCNC: 27 UG/DL (ref 40–170)
LYMPHOCYTES # BLD AUTO: 0.89 K/UL (ref 1–4.8)
LYMPHOCYTES NFR BLD: 30.4 % (ref 22–41)
MCH RBC QN AUTO: 28.4 PG (ref 27–33)
MCHC RBC AUTO-ENTMCNC: 31.9 G/DL (ref 33.6–35)
MCV RBC AUTO: 89.3 FL (ref 81.4–97.8)
MONOCYTES # BLD AUTO: 0.3 K/UL (ref 0–0.85)
MONOCYTES NFR BLD AUTO: 10.2 % (ref 0–13.4)
NEUTROPHILS # BLD AUTO: 1.61 K/UL (ref 2–7.15)
NEUTROPHILS NFR BLD: 55.1 % (ref 44–72)
NRBC # BLD AUTO: 0 K/UL
NRBC BLD-RTO: 0 /100 WBC
PLATELET # BLD AUTO: 313 K/UL (ref 164–446)
PMV BLD AUTO: 11.2 FL (ref 9–12.9)
RBC # BLD AUTO: 4.57 M/UL (ref 4.2–5.4)
THYROPEROXIDASE AB SERPL-ACNC: <9 IU/ML (ref 0–9)
TIBC SERPL-MCNC: 390 UG/DL (ref 250–450)
TIBC SERPL-MCNC: 403 UG/DL (ref 250–450)
UIBC SERPL-MCNC: 363 UG/DL (ref 110–370)
UIBC SERPL-MCNC: 378 UG/DL (ref 110–370)
WBC # BLD AUTO: 2.9 K/UL (ref 4.8–10.8)

## 2021-01-26 PROCEDURE — 83540 ASSAY OF IRON: CPT | Mod: 91

## 2021-01-26 PROCEDURE — 83550 IRON BINDING TEST: CPT | Mod: 91

## 2021-01-26 PROCEDURE — 36415 COLL VENOUS BLD VENIPUNCTURE: CPT

## 2021-01-26 PROCEDURE — 82728 ASSAY OF FERRITIN: CPT

## 2021-01-26 PROCEDURE — 83550 IRON BINDING TEST: CPT

## 2021-01-26 PROCEDURE — 85025 COMPLETE CBC W/AUTO DIFF WBC: CPT

## 2021-01-26 PROCEDURE — 83540 ASSAY OF IRON: CPT

## 2021-01-26 PROCEDURE — 86376 MICROSOMAL ANTIBODY EACH: CPT

## 2021-02-25 DIAGNOSIS — J30.1 SEASONAL ALLERGIC RHINITIS DUE TO POLLEN: ICD-10-CM

## 2021-02-25 RX ORDER — MOMETASONE FUROATE 50 UG/1
SPRAY, METERED NASAL
Qty: 17 G | Refills: 2 | Status: SHIPPED | OUTPATIENT
Start: 2021-02-25 | End: 2021-09-21

## 2021-03-01 DIAGNOSIS — E61.1 IRON DEFICIENCY: ICD-10-CM

## 2021-03-01 DIAGNOSIS — D50.0 BLOOD LOSS ANEMIA: ICD-10-CM

## 2021-03-10 ENCOUNTER — HOSPITAL ENCOUNTER (OUTPATIENT)
Dept: LAB | Facility: MEDICAL CENTER | Age: 46
End: 2021-03-10
Attending: FAMILY MEDICINE
Payer: COMMERCIAL

## 2021-03-10 DIAGNOSIS — E61.1 IRON DEFICIENCY: ICD-10-CM

## 2021-03-10 DIAGNOSIS — D50.0 BLOOD LOSS ANEMIA: ICD-10-CM

## 2021-03-10 LAB
BASOPHILS # BLD AUTO: 1.3 % (ref 0–1.8)
BASOPHILS # BLD: 0.08 K/UL (ref 0–0.12)
EOSINOPHIL # BLD AUTO: 0.04 K/UL (ref 0–0.51)
EOSINOPHIL NFR BLD: 0.7 % (ref 0–6.9)
ERYTHROCYTE [DISTWIDTH] IN BLOOD BY AUTOMATED COUNT: 62 FL (ref 35.9–50)
HCT VFR BLD AUTO: 45 % (ref 37–47)
HGB BLD-MCNC: 15 G/DL (ref 12–16)
IMM GRANULOCYTES # BLD AUTO: 0.01 K/UL (ref 0–0.11)
IMM GRANULOCYTES NFR BLD AUTO: 0.2 % (ref 0–0.9)
IRON SATN MFR SERPL: 22 % (ref 15–55)
IRON SERPL-MCNC: 70 UG/DL (ref 40–170)
LYMPHOCYTES # BLD AUTO: 1.18 K/UL (ref 1–4.8)
LYMPHOCYTES NFR BLD: 19.5 % (ref 22–41)
MCH RBC QN AUTO: 31.4 PG (ref 27–33)
MCHC RBC AUTO-ENTMCNC: 33.3 G/DL (ref 33.6–35)
MCV RBC AUTO: 94.3 FL (ref 81.4–97.8)
MONOCYTES # BLD AUTO: 0.54 K/UL (ref 0–0.85)
MONOCYTES NFR BLD AUTO: 8.9 % (ref 0–13.4)
NEUTROPHILS # BLD AUTO: 4.2 K/UL (ref 2–7.15)
NEUTROPHILS NFR BLD: 69.4 % (ref 44–72)
NRBC # BLD AUTO: 0 K/UL
NRBC BLD-RTO: 0 /100 WBC
PLATELET # BLD AUTO: 259 K/UL (ref 164–446)
PMV BLD AUTO: 11.5 FL (ref 9–12.9)
RBC # BLD AUTO: 4.77 M/UL (ref 4.2–5.4)
TIBC SERPL-MCNC: 322 UG/DL (ref 250–450)
UIBC SERPL-MCNC: 252 UG/DL (ref 110–370)
WBC # BLD AUTO: 6.1 K/UL (ref 4.8–10.8)

## 2021-03-10 PROCEDURE — 83540 ASSAY OF IRON: CPT

## 2021-03-10 PROCEDURE — 85025 COMPLETE CBC W/AUTO DIFF WBC: CPT

## 2021-03-10 PROCEDURE — 82728 ASSAY OF FERRITIN: CPT

## 2021-03-10 PROCEDURE — 36415 COLL VENOUS BLD VENIPUNCTURE: CPT

## 2021-03-10 PROCEDURE — 83550 IRON BINDING TEST: CPT

## 2021-03-11 LAB — FERRITIN SERPL-MCNC: 19.9 NG/ML (ref 10–291)

## 2021-03-12 ENCOUNTER — TELEMEDICINE (OUTPATIENT)
Dept: MEDICAL GROUP | Facility: MEDICAL CENTER | Age: 46
End: 2021-03-12
Payer: COMMERCIAL

## 2021-03-12 VITALS — HEIGHT: 65 IN | WEIGHT: 138 LBS | BODY MASS INDEX: 22.99 KG/M2

## 2021-03-12 DIAGNOSIS — F51.01 PRIMARY INSOMNIA: ICD-10-CM

## 2021-03-12 PROCEDURE — 99213 OFFICE O/P EST LOW 20 MIN: CPT | Mod: 95,CR | Performed by: FAMILY MEDICINE

## 2021-03-12 RX ORDER — DIAZEPAM 5 MG/1
5 TABLET ORAL NIGHTLY PRN
Qty: 30 TABLET | Refills: 0 | Status: SHIPPED | OUTPATIENT
Start: 2021-03-12 | End: 2021-04-11

## 2021-03-12 ASSESSMENT — PATIENT HEALTH QUESTIONNAIRE - PHQ9: CLINICAL INTERPRETATION OF PHQ2 SCORE: 0

## 2021-03-12 ASSESSMENT — FIBROSIS 4 INDEX: FIB4 SCORE: 1.07

## 2021-03-12 NOTE — PROGRESS NOTES
Virtual Visit: Established Patient   This visit was conducted via Zoom  using secure and encrypted videoconferencing technology. The patient was in a private location in the state of [place].    The patient's identity was confirmed and verbal consent was obtained for this virtual visit.      CC:  Insomnia                                                                                                                                   HPI:   Hilary presents today with the following.    1. Primary insomnia  Insomnia: Reports difficulty falling, staying asleep both.   She goes to bed around 10 pm or a little later and wakes up 2-3 am  without alarm. She tries to go back to sleep.  Reports feeling tired and nonrefreshed in morning.   Denies morning headache, daytime fatigue, snoring, limb movements.   Pt exercises Yes. Time of day early or mid morning  Consumes caffeine: No. Very rare.  She does report falling asleep or feeling sleepy when relaxing, watching TV.  Never driving.  Does not happen at work, watching movies in a theater.  Is working from home on phone and computer, does not fall asleep involuntarily  Possible factors contributing to fatigue include: stress or not getting enough sleep  Previous treatment: melatonin and ashwaganda nightly.  Still doing that.  Gets very drowsy in the day from benadryl so avoids.  Discussed trial of Diazepam.      Her 11 year old is on full home school.      Patient Active Problem List    Diagnosis Date Noted   • Carpal tunnel syndrome on both sides 04/26/2019   • Situational anxiety 10/22/2018   • Fear of flying 10/22/2018   • Shotty lymph nodes 04/25/2017   • Sacral back pain 04/25/2017   • Vitamin D deficiency    • Psychophysiologic insomnia 03/25/2016   • Bilateral dry eyes    • Seasonal allergies        Current Outpatient Medications   Medication Sig Dispense Refill   • Melatonin 3 MG Cap Take 1 capsule by mouth every bedtime. 30 capsule 6   • diazePAM (VALIUM) 5 MG Tab Take 1  "tablet by mouth at bedtime as needed for Sleep for up to 30 days. 30 tablet 0   • mometasone (NASONEX) 50 MCG/ACT nasal spray INHALE 2 SPRAYS IN EACH NOSTRIL ONCE DAILY AS DIRECTED 17 g 2   • progesterone (PROMETRIUM) 100 MG Cap Take 100 mg by mouth. 10 days of every cycle.     • RESTASIS 0.05 % ophthalmic emulsion        No current facility-administered medications for this visit.         Allergies as of 03/12/2021 - Reviewed 03/12/2021   Allergen Reaction Noted   • Codeine Nausea 12/15/2009   • Vicodin [hydrocodone-acetaminophen] Nausea 12/15/2009        ROS:  Denies, chest pain, Shortness of breath, Edema.  Denies fever or chills.     Ht 1.651 m (5' 5\")   Wt 62.6 kg (138 lb)   BMI 22.96 kg/m²       Physical Exam:  Constitutional: Alert, no distress, well-groomed.  Skin: No rashes in visible areas.  Eye: Round. Conjunctiva clear, No icterus.   ENMT: Lips pink without lesions, good dentition, moist mucous membranes. Phonation normal.  Neck: No masses, no thyromegaly. Moves freely without pain.  Respiratory: Unlabored respiratory effort, no cough or audible wheeze  Psych: Alert and oriented x3, normal affect and mood.          Assessment and Plan.   45 y.o. female with the following issues.    1. Primary insomnia  Discussed trial of diazepam at night, only as needed.  PDMP reviewed, no previous controlled substance prescriptions.  - Melatonin 3 MG Cap; Take 1 capsule by mouth every bedtime.  Dispense: 30 capsule; Refill: 6  - diazePAM (VALIUM) 5 MG Tab; Take 1 tablet by mouth at bedtime as needed for Sleep for up to 30 days.  Dispense: 30 tablet; Refill: 0    "

## 2021-04-13 ENCOUNTER — PRE-ADMISSION TESTING (OUTPATIENT)
Dept: ADMISSIONS | Facility: MEDICAL CENTER | Age: 46
End: 2021-04-13
Attending: OBSTETRICS & GYNECOLOGY
Payer: COMMERCIAL

## 2021-04-13 DIAGNOSIS — Z01.812 PRE-OPERATIVE LABORATORY EXAMINATION: ICD-10-CM

## 2021-04-13 LAB
APPEARANCE UR: CLEAR
BASOPHILS # BLD AUTO: 1.5 % (ref 0–1.8)
BASOPHILS # BLD: 0.07 K/UL (ref 0–0.12)
BILIRUB UR QL STRIP.AUTO: NEGATIVE
COLOR UR: YELLOW
EOSINOPHIL # BLD AUTO: 0.1 K/UL (ref 0–0.51)
EOSINOPHIL NFR BLD: 2.1 % (ref 0–6.9)
ERYTHROCYTE [DISTWIDTH] IN BLOOD BY AUTOMATED COUNT: 58.2 FL (ref 35.9–50)
GLUCOSE UR STRIP.AUTO-MCNC: NEGATIVE MG/DL
HCG SERPL QL: NEGATIVE
HCT VFR BLD AUTO: 45.9 % (ref 37–47)
HGB BLD-MCNC: 15.4 G/DL (ref 12–16)
IMM GRANULOCYTES # BLD AUTO: 0.02 K/UL (ref 0–0.11)
IMM GRANULOCYTES NFR BLD AUTO: 0.4 % (ref 0–0.9)
KETONES UR STRIP.AUTO-MCNC: NEGATIVE MG/DL
LEUKOCYTE ESTERASE UR QL STRIP.AUTO: NEGATIVE
LYMPHOCYTES # BLD AUTO: 1.16 K/UL (ref 1–4.8)
LYMPHOCYTES NFR BLD: 24.4 % (ref 22–41)
MCH RBC QN AUTO: 32.2 PG (ref 27–33)
MCHC RBC AUTO-ENTMCNC: 33.6 G/DL (ref 33.6–35)
MCV RBC AUTO: 96 FL (ref 81.4–97.8)
MICRO URNS: NORMAL
MONOCYTES # BLD AUTO: 0.36 K/UL (ref 0–0.85)
MONOCYTES NFR BLD AUTO: 7.6 % (ref 0–13.4)
NEUTROPHILS # BLD AUTO: 3.04 K/UL (ref 2–7.15)
NEUTROPHILS NFR BLD: 64 % (ref 44–72)
NITRITE UR QL STRIP.AUTO: NEGATIVE
NRBC # BLD AUTO: 0 K/UL
NRBC BLD-RTO: 0 /100 WBC
PH UR STRIP.AUTO: 7 [PH] (ref 5–8)
PLATELET # BLD AUTO: 272 K/UL (ref 164–446)
PMV BLD AUTO: 10.1 FL (ref 9–12.9)
PROT UR QL STRIP: NEGATIVE MG/DL
RBC # BLD AUTO: 4.78 M/UL (ref 4.2–5.4)
RBC UR QL AUTO: NEGATIVE
SP GR UR STRIP.AUTO: 1.01
UROBILINOGEN UR STRIP.AUTO-MCNC: 0.2 MG/DL
WBC # BLD AUTO: 4.8 K/UL (ref 4.8–10.8)

## 2021-04-13 PROCEDURE — 84703 CHORIONIC GONADOTROPIN ASSAY: CPT

## 2021-04-13 PROCEDURE — 81003 URINALYSIS AUTO W/O SCOPE: CPT

## 2021-04-13 PROCEDURE — 36415 COLL VENOUS BLD VENIPUNCTURE: CPT

## 2021-04-13 PROCEDURE — U0003 INFECTIOUS AGENT DETECTION BY NUCLEIC ACID (DNA OR RNA); SEVERE ACUTE RESPIRATORY SYNDROME CORONAVIRUS 2 (SARS-COV-2) (CORONAVIRUS DISEASE [COVID-19]), AMPLIFIED PROBE TECHNIQUE, MAKING USE OF HIGH THROUGHPUT TECHNOLOGIES AS DESCRIBED BY CMS-2020-01-R: HCPCS

## 2021-04-13 PROCEDURE — U0005 INFEC AGEN DETEC AMPLI PROBE: HCPCS

## 2021-04-13 PROCEDURE — C9803 HOPD COVID-19 SPEC COLLECT: HCPCS

## 2021-04-13 PROCEDURE — 85025 COMPLETE CBC W/AUTO DIFF WBC: CPT

## 2021-04-13 RX ORDER — DIAZEPAM 5 MG/1
2.5 TABLET ORAL PRN
COMMUNITY
End: 2022-06-29

## 2021-04-13 RX ORDER — CHOLECALCIFEROL (VITAMIN D3) 1250 MCG
50 CAPSULE ORAL DAILY
COMMUNITY

## 2021-04-13 RX ORDER — GUAIFENESIN/EPHEDRINE HCL 200-12.5MG
100 TABLET ORAL
COMMUNITY
End: 2022-06-29

## 2021-04-13 ASSESSMENT — FIBROSIS 4 INDEX: FIB4 SCORE: 1.07

## 2021-04-14 LAB
SARS-COV-2 RNA RESP QL NAA+PROBE: NOTDETECTED
SPECIMEN SOURCE: NORMAL

## 2021-04-15 NOTE — OR NURSING
COVID-19 Pre-surgery screenin. Do you have an undiagnosed respiratory illness or symptoms such as coughing or sneezing? NO (Yes/No)  a. Onset of Sx -  b. Acute vs. chronic respiratory illness -    2. Do you have an unexplained fever greater than 100.4 degrees Fahrenheit or 38 degrees Celsius?     NO (Yes/No)    3. Have you had direct exposure to a patient who tested positive for Covid-19?    NO (Yes/No)    4. Have you had any loss of your sense of taste or smell? Have you had N/V or sore throat? NO    Patient has been informed of visitor policy and asked to wear a mask upon entering the hospital   YES (Yes/No)    COVID-19 Pre-surgery screening:    Pt did not answer generic voicemail was left with call back number.

## 2021-04-16 ENCOUNTER — HOSPITAL ENCOUNTER (OUTPATIENT)
Facility: MEDICAL CENTER | Age: 46
End: 2021-04-16
Attending: OBSTETRICS & GYNECOLOGY | Admitting: OBSTETRICS & GYNECOLOGY
Payer: COMMERCIAL

## 2021-04-16 ENCOUNTER — ANESTHESIA EVENT (OUTPATIENT)
Dept: SURGERY | Facility: MEDICAL CENTER | Age: 46
End: 2021-04-16
Payer: COMMERCIAL

## 2021-04-16 ENCOUNTER — ANESTHESIA (OUTPATIENT)
Dept: SURGERY | Facility: MEDICAL CENTER | Age: 46
End: 2021-04-16
Payer: COMMERCIAL

## 2021-04-16 VITALS
HEART RATE: 47 BPM | WEIGHT: 132.06 LBS | SYSTOLIC BLOOD PRESSURE: 125 MMHG | DIASTOLIC BLOOD PRESSURE: 72 MMHG | TEMPERATURE: 97.3 F | RESPIRATION RATE: 16 BRPM | OXYGEN SATURATION: 97 % | HEIGHT: 65 IN | BODY MASS INDEX: 22 KG/M2

## 2021-04-16 DIAGNOSIS — G89.18 POST-OPERATIVE PAIN: ICD-10-CM

## 2021-04-16 PROBLEM — Z98.890 STATUS POST HYSTEROSCOPY: Status: ACTIVE | Noted: 2021-04-16

## 2021-04-16 LAB
HCG UR QL: NEGATIVE
PATHOLOGY CONSULT NOTE: NORMAL

## 2021-04-16 PROCEDURE — 700105 HCHG RX REV CODE 258: Performed by: OBSTETRICS & GYNECOLOGY

## 2021-04-16 PROCEDURE — 160002 HCHG RECOVERY MINUTES (STAT): Performed by: OBSTETRICS & GYNECOLOGY

## 2021-04-16 PROCEDURE — 700111 HCHG RX REV CODE 636 W/ 250 OVERRIDE (IP): Performed by: ANESTHESIOLOGY

## 2021-04-16 PROCEDURE — A9270 NON-COVERED ITEM OR SERVICE: HCPCS | Performed by: ANESTHESIOLOGY

## 2021-04-16 PROCEDURE — 81025 URINE PREGNANCY TEST: CPT

## 2021-04-16 PROCEDURE — 501838 HCHG SUTURE GENERAL: Performed by: OBSTETRICS & GYNECOLOGY

## 2021-04-16 PROCEDURE — 502587 HCHG PACK, D&C: Performed by: OBSTETRICS & GYNECOLOGY

## 2021-04-16 PROCEDURE — 501394 HCHG SOLUTION SORBITOL 3% 3000ML BAG: Performed by: OBSTETRICS & GYNECOLOGY

## 2021-04-16 PROCEDURE — 700102 HCHG RX REV CODE 250 W/ 637 OVERRIDE(OP): Performed by: ANESTHESIOLOGY

## 2021-04-16 PROCEDURE — 160041 HCHG SURGERY MINUTES - EA ADDL 1 MIN LEVEL 4: Performed by: OBSTETRICS & GYNECOLOGY

## 2021-04-16 PROCEDURE — 160036 HCHG PACU - EA ADDL 30 MINS PHASE I: Performed by: OBSTETRICS & GYNECOLOGY

## 2021-04-16 PROCEDURE — 160048 HCHG OR STATISTICAL LEVEL 1-5: Performed by: OBSTETRICS & GYNECOLOGY

## 2021-04-16 PROCEDURE — 160025 RECOVERY II MINUTES (STATS): Performed by: OBSTETRICS & GYNECOLOGY

## 2021-04-16 PROCEDURE — 160009 HCHG ANES TIME/MIN: Performed by: OBSTETRICS & GYNECOLOGY

## 2021-04-16 PROCEDURE — 160046 HCHG PACU - 1ST 60 MINS PHASE II: Performed by: OBSTETRICS & GYNECOLOGY

## 2021-04-16 PROCEDURE — 160035 HCHG PACU - 1ST 60 MINS PHASE I: Performed by: OBSTETRICS & GYNECOLOGY

## 2021-04-16 PROCEDURE — 160029 HCHG SURGERY MINUTES - 1ST 30 MINS LEVEL 4: Performed by: OBSTETRICS & GYNECOLOGY

## 2021-04-16 PROCEDURE — 88305 TISSUE EXAM BY PATHOLOGIST: CPT

## 2021-04-16 RX ORDER — DIPHENHYDRAMINE HYDROCHLORIDE 50 MG/ML
12.5 INJECTION INTRAMUSCULAR; INTRAVENOUS
Status: DISCONTINUED | OUTPATIENT
Start: 2021-04-16 | End: 2021-04-16 | Stop reason: HOSPADM

## 2021-04-16 RX ORDER — SIMETHICONE 80 MG
80 TABLET,CHEWABLE ORAL EVERY 8 HOURS PRN
Status: CANCELLED | OUTPATIENT
Start: 2021-04-16

## 2021-04-16 RX ORDER — ONDANSETRON 2 MG/ML
INJECTION INTRAMUSCULAR; INTRAVENOUS PRN
Status: DISCONTINUED | OUTPATIENT
Start: 2021-04-16 | End: 2021-04-16 | Stop reason: SURG

## 2021-04-16 RX ORDER — SODIUM CHLORIDE, SODIUM LACTATE, POTASSIUM CHLORIDE, CALCIUM CHLORIDE 600; 310; 30; 20 MG/100ML; MG/100ML; MG/100ML; MG/100ML
INJECTION, SOLUTION INTRAVENOUS CONTINUOUS
Status: DISCONTINUED | OUTPATIENT
Start: 2021-04-16 | End: 2021-04-16 | Stop reason: HOSPADM

## 2021-04-16 RX ORDER — PROMETHAZINE HYDROCHLORIDE 25 MG/1
25 SUPPOSITORY RECTAL EVERY 4 HOURS PRN
Status: CANCELLED | OUTPATIENT
Start: 2021-04-16

## 2021-04-16 RX ORDER — HYDROMORPHONE HYDROCHLORIDE 1 MG/ML
0.2 INJECTION, SOLUTION INTRAMUSCULAR; INTRAVENOUS; SUBCUTANEOUS
Status: DISCONTINUED | OUTPATIENT
Start: 2021-04-16 | End: 2021-04-16 | Stop reason: HOSPADM

## 2021-04-16 RX ORDER — DEXAMETHASONE SODIUM PHOSPHATE 4 MG/ML
INJECTION, SOLUTION INTRA-ARTICULAR; INTRALESIONAL; INTRAMUSCULAR; INTRAVENOUS; SOFT TISSUE PRN
Status: DISCONTINUED | OUTPATIENT
Start: 2021-04-16 | End: 2021-04-16 | Stop reason: SURG

## 2021-04-16 RX ORDER — OXYCODONE HYDROCHLORIDE 5 MG/1
5 TABLET ORAL EVERY 4 HOURS PRN
Status: DISCONTINUED | OUTPATIENT
Start: 2021-04-16 | End: 2021-04-16 | Stop reason: HOSPADM

## 2021-04-16 RX ORDER — KETOROLAC TROMETHAMINE 30 MG/ML
30 INJECTION, SOLUTION INTRAMUSCULAR; INTRAVENOUS EVERY 6 HOURS PRN
Status: DISCONTINUED | OUTPATIENT
Start: 2021-04-16 | End: 2021-04-16 | Stop reason: HOSPADM

## 2021-04-16 RX ORDER — OXYCODONE HYDROCHLORIDE AND ACETAMINOPHEN 5; 325 MG/1; MG/1
2 TABLET ORAL
Status: COMPLETED | OUTPATIENT
Start: 2021-04-16 | End: 2021-04-16

## 2021-04-16 RX ORDER — MEPERIDINE HYDROCHLORIDE 25 MG/ML
12.5 INJECTION INTRAMUSCULAR; INTRAVENOUS; SUBCUTANEOUS
Status: DISCONTINUED | OUTPATIENT
Start: 2021-04-16 | End: 2021-04-16 | Stop reason: HOSPADM

## 2021-04-16 RX ORDER — ONDANSETRON 2 MG/ML
4 INJECTION INTRAMUSCULAR; INTRAVENOUS
Status: DISCONTINUED | OUTPATIENT
Start: 2021-04-16 | End: 2021-04-16 | Stop reason: HOSPADM

## 2021-04-16 RX ORDER — HYDRALAZINE HYDROCHLORIDE 20 MG/ML
5 INJECTION INTRAMUSCULAR; INTRAVENOUS
Status: DISCONTINUED | OUTPATIENT
Start: 2021-04-16 | End: 2021-04-16 | Stop reason: HOSPADM

## 2021-04-16 RX ORDER — ONDANSETRON HYDROCHLORIDE 8 MG/1
8 TABLET, FILM COATED ORAL EVERY 8 HOURS PRN
Qty: 10 TABLET | Refills: 0 | Status: SHIPPED | OUTPATIENT
Start: 2021-04-16 | End: 2022-06-29

## 2021-04-16 RX ORDER — IBUPROFEN 600 MG/1
600 TABLET ORAL EVERY 6 HOURS PRN
Qty: 30 TABLET | Refills: 0 | Status: SHIPPED | OUTPATIENT
Start: 2021-04-16 | End: 2022-06-29

## 2021-04-16 RX ORDER — IBUPROFEN 600 MG/1
600 TABLET ORAL EVERY 6 HOURS PRN
Status: DISCONTINUED | OUTPATIENT
Start: 2021-04-16 | End: 2021-04-16 | Stop reason: HOSPADM

## 2021-04-16 RX ORDER — OXYCODONE HYDROCHLORIDE AND ACETAMINOPHEN 5; 325 MG/1; MG/1
1 TABLET ORAL
Status: COMPLETED | OUTPATIENT
Start: 2021-04-16 | End: 2021-04-16

## 2021-04-16 RX ORDER — MIDAZOLAM HYDROCHLORIDE 1 MG/ML
1 INJECTION INTRAMUSCULAR; INTRAVENOUS
Status: DISCONTINUED | OUTPATIENT
Start: 2021-04-16 | End: 2021-04-16 | Stop reason: HOSPADM

## 2021-04-16 RX ORDER — OXYCODONE HYDROCHLORIDE 5 MG/1
5 TABLET ORAL EVERY 4 HOURS PRN
Qty: 10 TABLET | Refills: 0 | Status: SHIPPED | OUTPATIENT
Start: 2021-04-16 | End: 2021-04-21

## 2021-04-16 RX ORDER — HALOPERIDOL 5 MG/ML
1 INJECTION INTRAMUSCULAR
Status: DISCONTINUED | OUTPATIENT
Start: 2021-04-16 | End: 2021-04-16 | Stop reason: HOSPADM

## 2021-04-16 RX ORDER — LABETALOL HYDROCHLORIDE 5 MG/ML
5 INJECTION, SOLUTION INTRAVENOUS
Status: DISCONTINUED | OUTPATIENT
Start: 2021-04-16 | End: 2021-04-16 | Stop reason: HOSPADM

## 2021-04-16 RX ORDER — BUPIVACAINE HYDROCHLORIDE 2.5 MG/ML
INJECTION, SOLUTION EPIDURAL; INFILTRATION; INTRACAUDAL
Status: DISCONTINUED
Start: 2021-04-16 | End: 2021-04-16 | Stop reason: HOSPADM

## 2021-04-16 RX ORDER — HYDROMORPHONE HYDROCHLORIDE 1 MG/ML
0.1 INJECTION, SOLUTION INTRAMUSCULAR; INTRAVENOUS; SUBCUTANEOUS
Status: DISCONTINUED | OUTPATIENT
Start: 2021-04-16 | End: 2021-04-16 | Stop reason: HOSPADM

## 2021-04-16 RX ORDER — HYDROMORPHONE HYDROCHLORIDE 1 MG/ML
0.4 INJECTION, SOLUTION INTRAMUSCULAR; INTRAVENOUS; SUBCUTANEOUS
Status: DISCONTINUED | OUTPATIENT
Start: 2021-04-16 | End: 2021-04-16 | Stop reason: HOSPADM

## 2021-04-16 RX ADMIN — PROPOFOL 200 MG: 10 INJECTION, EMULSION INTRAVENOUS at 11:28

## 2021-04-16 RX ADMIN — ONDANSETRON 4 MG: 2 INJECTION INTRAMUSCULAR; INTRAVENOUS at 11:33

## 2021-04-16 RX ADMIN — OXYCODONE HYDROCHLORIDE AND ACETAMINOPHEN 1 TABLET: 5; 325 TABLET ORAL at 12:33

## 2021-04-16 RX ADMIN — FENTANYL CITRATE 100 MCG: 50 INJECTION, SOLUTION INTRAMUSCULAR; INTRAVENOUS at 11:30

## 2021-04-16 RX ADMIN — FENTANYL CITRATE 25 MCG: 50 INJECTION, SOLUTION INTRAMUSCULAR; INTRAVENOUS at 12:18

## 2021-04-16 RX ADMIN — SODIUM CHLORIDE, POTASSIUM CHLORIDE, SODIUM LACTATE AND CALCIUM CHLORIDE: 600; 310; 30; 20 INJECTION, SOLUTION INTRAVENOUS at 11:25

## 2021-04-16 RX ADMIN — DEXAMETHASONE SODIUM PHOSPHATE 4 MG: 4 INJECTION, SOLUTION INTRA-ARTICULAR; INTRALESIONAL; INTRAMUSCULAR; INTRAVENOUS; SOFT TISSUE at 11:33

## 2021-04-16 ASSESSMENT — FIBROSIS 4 INDEX: FIB4 SCORE: 1.02

## 2021-04-16 ASSESSMENT — PAIN SCALES - GENERAL: PAIN_LEVEL: 2

## 2021-04-16 ASSESSMENT — PAIN DESCRIPTION - PAIN TYPE
TYPE: SURGICAL PAIN
TYPE: SURGICAL PAIN

## 2021-04-16 NOTE — OR NURSING
1240 Received report from Miracle OSHEA.    1300 Heat pack applied to lower abdomen for cramping pain

## 2021-04-16 NOTE — ANESTHESIA PREPROCEDURE EVALUATION
Relevant Problems   No relevant active problems       Physical Exam    Airway   Mallampati: I  TM distance: >3 FB  Neck ROM: full       Cardiovascular - normal exam  Rhythm: regular  Rate: normal  (-) murmur     Dental - normal exam           Pulmonary - normal exam  Breath sounds clear to auscultation     Abdominal - normal exam     Neurological - normal exam                 Anesthesia Plan    ASA 1       Plan - general       Airway plan will be LMA          Induction: intravenous    Postoperative Plan: Postoperative administration of opioids is intended.    Pertinent diagnostic labs and testing reviewed    Informed Consent:    Anesthetic plan and risks discussed with patient.    Use of blood products discussed with: patient whom consented to blood products.

## 2021-04-16 NOTE — ANESTHESIA PROCEDURE NOTES
Airway    Date/Time: 4/16/2021 11:29 AM  Performed by: Dilip Muñoz M.D.  Authorized by: Dilip Muñoz M.D.     Location:  OR  Urgency:  Elective  Difficult Airway: No    Indications for Airway Management:  Anesthesia      Spontaneous Ventilation: absent    Sedation Level:  Deep  Preoxygenated: Yes    Mask Difficulty Assessment:  1 - vent by mask  Final Airway Type:  Supraglottic airway  Final Supraglottic Airway:  Standard LMA    SGA Size:  3  Number of Attempts at Approach:  1

## 2021-04-16 NOTE — PROGRESS NOTES
Pre-op H&P dictated  # [id number]  A- menorrhagia  Uterine fibroids  Endocavitary lesion- suspicious for endometrial polyp  P- EUA, hysteroscopy, D&C, possible polypectomy/ removal lesion    awestfall

## 2021-04-16 NOTE — OR NURSING
1212 received from the OR, report from Dr Muñoz, s/p hysteroscopy, asleep, no OA, breathing unlabored & clear, abd soft no vag bleeding  1230 awake, co pain, meds given, tolerating sips of water  1240 report to Naz OSHEA

## 2021-04-16 NOTE — ANESTHESIA TIME REPORT
Anesthesia Start and Stop Event Times     Date Time Event    4/16/2021 1026 Ready for Procedure     1125 Anesthesia Start     1213 Anesthesia Stop        Responsible Staff  04/16/21    Name Role Begin End    August W DASHA Muñoz. Anesth 1125 1213        Preop Diagnosis (Free Text):  Pre-op Diagnosis     MENORRHAGIA, ENDOMETRIAL POLYP        Preop Diagnosis (Codes):    Post op Diagnosis  Menorrhagia      Premium Reason  Non-Premium    Comments:

## 2021-04-16 NOTE — H&P
DATE OF ADMISSION:  2021     IDENTIFICATION PRESENT ILLNESS:  This is a 45-year-old  female,    0, para 0.     CHIEF COMPLAINT:  Menorrhagia with anemia.  Ultrasound finding of endometrial   polyp in addition to known uterine fibroids.     HISTORY OF PRESENT ILLNESS:  This is a patient who is followed in our office   by certified nurse midwife, Leilani Geronimo.  The patient was initially seen in   10/2016 with complaints of anemia and menorrhagia.  Her menses have remained   regular every 26 days, but are heavy when she does have her menses.  She had   been followed for uterine fibroids as well as history of ovarian cyst.  She   also was complaining at that time of some perimenopausal symptoms of hot   flashes, sleep disturbance and mood swings.  She was evaluated by Leilani.  Pap   smear was normal in 2020, continued to complain of heavy menses.  She had   been started on progesterone for sleep and was sleeping better while taking   progesterone.  She had ultrasounds with our ultrasound technician in the   office, which were done in January and then a followup in 2021.  The   ultrasound performed in February shows a uterus that is 8.6 cm in longitudinal   x 6.1 x 4.5 transverse and enlarged, anteverted.  The myometrium is   asymmetrically thickened.  The endometrium is ill-defined.  Ovaries showed   small follicles.  No persistent ovarian cyst, which she had previously had a   left ovarian cyst.  A 1.7x0.5x1.2 cm echogenic mass was again noted within the   endometrial cavity.  A feeder vessel was noted on today's exam, which appears   to originate from the anterior wall of the endometrial canal, cannot rule out   polyp.  No free fluid and therefore, because the patient continued to have   regular timeable menses, but menorrhagia, she was referred for gynecologic   evaluation to me.  I saw her in 2021 and again at that time complaining of   heavy menses and followed by her primary care  "physician for anemia, although   she reported that she had a lab just prior to that, that showed a normal H and   H for the first time in a \"long while.\"  She has no uterine fibroids and   would really like to avoid a major surgical procedure if possible.  She does   with heavy menses over the age of 40, need some type of endometrial sampling   and we discussed endometrial biopsy; however, my recommendation is secondary   to the ultrasound finding of an endometrial polyp to proceed with   hysteroscopy, D and C, possible polypectomy.  The patient would like to   proceed with this again.  Menses are regular in timing, but heavy and the   patient is adamant to try to avoid a major surgical procedure on to get   through menopause hoping her fibroids will no longer cause problems.     PAST MEDICAL HISTORY:  Significant for seasonal allergies.  She had some GI   issues related to food sensitivities, known uterine fibroids and anemia.     PAST SURGICAL HISTORY:  Significant for an appendectomy in 2005 and wisdom   teeth removal in 1993.     GYNECOLOGIC HISTORY:  No history of abnormal Paps, normal Pap in 11/2020.  She   is complaining of menorrhagia, hot flashes and mood swings and has a known   history of fibroids with now suspicious for endometrial polyp.     SOCIAL HISTORY:  She denies smoking.  She is a social drinker.  No drug use.    Sexually active and monogamous.     FAMILY HISTORY:  Her mother with hypertension, heart disease and renal disease   as well as psychiatric disease.  Her father with hypertension, diabetes and   stroke.  There was no known family anesthetic reactions.     REVIEW OF SYSTEMS:  When I saw her with the exception of the heavy menses, is   completely negative full ten review of systems.     PHYSICAL EXAMINATION:  VITAL SIGNS:  which was not performed by me, but was performed by the nurse   midwife in the office, her weight was 137 pounds, BMI is 22, blood pressure   130/78.  Urinalysis " negative.  HEENT:  Within normal limits.  NECK:  Supple.  LUNGS:  Clear to auscultation bilaterally.  HEART:  Regular rate and rhythm.  Breast exam reported as normal.  ABDOMEN:  Soft, nondistended, nontender.  PELVIC: Essentially reported as normal with external genitalia, Bartholin,   urethral, and [place] glands within normal limits.  Vagina is pink and moist,   without gross lesions.  Cervix is nulliparous and closed.  Uterus is slightly   enlarged, mobile, nontender.  Cervix was slightly stenotic.  Adnexa, no masses   are palpated, nontender to exam at that time.     LABORATORY DATA:  She had preop labs drawn on 04/13/2021, hemoglobin is 15,   hematocrit 45, platelets are 272.  HCG negative.  COVID-19 nasal swab done on   the same date is negative.     DISCUSSION:  Thorough discussion with the patient on 03/12/2021 in my office   concerning all of the above findings.  She has known uterine fibroids.   Menorrhagia, but no intermenstrual bleeding, normal timing of cycles,   previously diagnosed with anemia, although normal H and H at this time.    Ultrasound showing a likely diffuse uterine fibroids, but very suspicious for   an endometrial polyp.  We discussed again the need for endometrial biopsy with   heavy bleeding over the age of 40, EMB versus hysteroscopy, D and C,   polypectomy.  I have recommended the latter secondary to potential treatment   of her heavy bleeding if it is indeed caused by the endometrial polyp.  The   patient understands that her heavy bleeding may be purely related to the   fibroids and this cannot be adequately or fully treated without likely   hysterectomy and again she is quite adamant that she would like to avoid a   major surgical operation if possible.  She understands that she may require   further surgical management and treatment after this procedure and that this   is diagnostic but potentially therapeutic as well and she would like to   proceed.  Therefore, the risks,  benefits and alternatives of surgical   procedure itself were discussed in detail, which include the risk of   anesthesia, which include the risk of death, risk of injury to other   intraabdominal and pelvic organs including but not limited to bowel, bladder,   blood vessels, ureters and nerves, all requiring further surgical repair; if   delayed diagnosis, possible permanent organ damage, risk of hemorrhage   requiring transfusion.  She verbally consents to blood transfusion, emergent   situation, risk of bowel injury requiring repair up to including bowel   resection and permanent colostomy, risk of bladder and ureteral injuries   requiring repair up to including permanent catheterization or reimplantation,   risk of nerve damage from intraoperative damage, intraoperative placement   causing chronic pain syndromes and loss of limb mobility, risk of uterine   perforation with an abnormal uterus with fibroids are slightly increased and   uterine perforation may require laparoscopy or laparotomy for evaluation of   damage and repair.  She understands, we will get pathology to confirm the   pathology of the endometrial cavity.  If there was a polyp, we will certainly   try to remove this with the hysteroscopic equipment and the risks of this   procedure also discussed again.  Again, she understands that if her bleeding   is from the uterine fibroids, this procedure may not be therapeutic, but   certainly can prove normal endometrial tissue as the patient is desiring to   await menopause if possible.  All of her questions were answered and she would   like to proceed as recommended.     ASSESSMENT:  1.  Menorrhagia.  2.  Known uterine fibroids.  3.  Intracavitary lesion suspicious for endometrial polyp.     PLAN:  The patient is prepared and consented for exam under anesthesia with   hysteroscopy, dilation and curettage, possible polypectomy, possible removal   of intracavitary  lesion.        ______________________________  MD JORGE ALBERTO Falcon/RYLAND    DD:  04/16/2021 09:31  DT:  04/16/2021 10:49    Job#:  [id number]

## 2021-04-16 NOTE — DISCHARGE INSTRUCTIONS
ACTIVITY: Rest and take it easy for the first 24 hours.  A responsible adult is recommended to remain with you during that time.  It is normal to feel sleepy.  We encourage you to not do anything that requires balance, judgment or coordination.    MILD FLU-LIKE SYMPTOMS ARE NORMAL. YOU MAY EXPERIENCE GENERALIZED MUSCLE ACHES, THROAT IRRITATION, HEADACHE AND/OR SOME NAUSEA.    FOR 24 HOURS DO NOT:  Drive, operate machinery or run household appliances.  Drink beer or alcoholic beverages.   Make important decisions or sign legal documents.    SPECIAL INSTRUCTIONS: *continue medications as previously ordered**    DIET: To avoid nausea, slowly advance diet as tolerated, avoiding spicy or greasy foods for the first day.  Add more substantial food to your diet according to your physician's instructions.  Babies can be fed formula or breast milk as soon as they are hungry.  INCREASE FLUIDS AND FIBER TO AVOID CONSTIPATION.    SURGICAL DRESSING/BATHING: **may shower but no baths, hot tub, swimming x 2 weeks*    FOLLOW-UP APPOINTMENT:  A follow-up appointment should be arranged with your doctor in *call for appt 1-2 weeks*; call to schedule.    You should CALL YOUR PHYSICIAN if you develop:  Fever greater than 101 degrees F.  Pain not relieved by medication, or persistent nausea or vomiting.  Excessive bleeding (blood soaking through dressing) or unexpected drainage from the wound.  Extreme redness or swelling around the incision site, drainage of pus or foul smelling drainage.  Inability to urinate or empty your bladder within 8 hours.  Problems with breathing or chest pain.    You should call 911 if you develop problems with breathing or chest pain.  If you are unable to contact your doctor or surgical center, you should go to the nearest emergency room or urgent care center.  Physician's telephone #:     If any questions arise, call your doctor.  If your doctor is not available, please feel free to call the Surgical  Center at [phone]. The Contact Center is open Monday through Friday 7AM to 5PM and may speak to a nurse at [phone], or toll free at [phone].     A registered nurse may call you a few days after your surgery to see how you are doing after your procedure.    MEDICATIONS: Resume taking daily medication.  Take prescribed pain medication with food.  If no medication is prescribed, you may take non-aspirin pain medication if needed.  PAIN MEDICATION CAN BE VERY CONSTIPATING.  Take a stool softener or laxative such as senokot, pericolace, or milk of magnesia if needed.    Prescription given for *oxycodone, zofran, ibuprofen**.  Last pain medication given at *12:30pm next dose after 12:45pm**.    If your physician has prescribed pain medication that includes Acetaminophen (Tylenol), do not take additional Acetaminophen (Tylenol) while taking the prescribed medication.    Depression / Suicide Risk    As you are discharged from this [facility] facility, it is important to learn how to keep safe from harming yourself.    Recognize the warning signs:  · Abrupt changes in personality, positive or negative- including increase in energy   · Giving away possessions  · Change in eating patterns- significant weight changes-  positive or negative  · Change in sleeping patterns- unable to sleep or sleeping all the time   · Unwillingness or inability to communicate  · Depression  · Unusual sadness, discouragement and loneliness  · Talk of wanting to die  · Neglect of personal appearance   · Rebelliousness- reckless behavior  · Withdrawal from people/activities they love  · Confusion- inability to concentrate     If you or a loved one observes any of these behaviors or has concerns about self-harm, here's what you can do:  · Talk about it- your feelings and reasons for harming yourself  · Remove any means that you might use to hurt yourself (examples: pills, rope, extension cords, firearm)  · Get professional help  from the community (Mental Health, Substance Abuse, psychological counseling)  · Do not be alone:Call your Safe Contact- someone whom you trust who will be there for you.  · Call your local CRISIS HOTLINE 235-3697 or 560-067-7496  · Call your local Children's Mobile Crisis Response Team [place] (753) 221-0428 or www.Flipaste  · Call the toll free National Suicide Prevention Hotlines   · National Suicide Prevention Lifeline 149-491-XNIB (6866)  · National Hope Line Network 800-SUICIDE (825-0399)

## 2021-04-16 NOTE — OR NURSING
9279 discharge instructions given to , he verbalized understanding and had no questions or concern. Pt getting dressed.

## 2021-04-16 NOTE — OP REPORT
DATE OF SERVICE:  04/16/2021     PREOPERATIVE DIAGNOSES:  1.  Menorrhagia.  2.  Uterine fibroids.  3.  Intracavitary lesion.     POSTOPERATIVE DIAGNOSES:  1.  Menorrhagia.  2.  Uterine fibroids.  3.  Intracavitary lesion.     PROCEDURES:  Exam under anesthesia, hysteroscopy, dilation and curettage, and   polypectomy.     SURGEON:  Susana Barreto MD     ANESTHESIOLOGIST:  Dilip Muñoz MD     ANESTHESIA:  General LMA anesthesia.     COMPLICATIONS:  None.     ESTIMATED BLOOD LOSS:  Minimal.     INTRAOPERATIVE FINDINGS:  Include a 1.5 cm endometrial mass, which appears   consistent with a polyp, otherwise a normal intrauterine cavity with no other   intracavitary masses, lesions, or thickened endometrium.     SPECIMEN TO PATHOLOGY:  As a single specimen, the endometrial mass that was   removed as well as endometrial curettings.     DESCRIPTION OF PROCEDURE IN DETAIL:  After proper consents obtained, the   patient was taken to the operating room where LMA anesthesia obtained by Dr. Muñoz without difficulty.  She was placed in the dorsal lithotomy position.    Exam under anesthesia reveals her cervix way off to her right side, a very   irregular shaped uterus.  No adnexal masses identified.  She was then prepped   in normal sterile fashion.  After the appropriate time interval, she was   draped in normal sterile fashion.  She was straight catheterized of 100 mL of   clear urine.  [place] speculum was placed in the vaginal vault.  The anterior   lip of cervix was grasped with a single tooth tenaculum.  [place] speculum was   removed, weighted speculum was placed and the uterus was sounded, which goes   away off to her right side to 8 cm.  The cervix was then serially dilated with   Hegar dilators until appropriately dilated and the diagnostic hysteroscope   was inserted through the external and internal cervical os.  The cavity was   distended with medium.  The cavity was very adequately visualized.  The left    tubal ostia was identified.  The right tubal ostia was blocked somewhat by a   polypoid type lesion coming off the anterior portion just at the fundus.  The   remainder of the cavity was all visualized and appears normal.  Therefore, the   diagnostic hysteroscope was removed.  Cervix was further dilated and the   operative hysteroscope with the loop was placed again through the external and   internal cervical os.  The cavity was distended.  This lesion was seen to be   on a medium thick stalk coming from the fundus in the anterior portion.  Using   the loop with electrocautery, I am able to place the loop just at the base of   this lesion, activate the cautery with a foot paddle with a gentle downward   motion, able to completely excise the polyp from the endometrial junction and   the polyp was actually at that time was stuck to the loop and it was removed   through the cervix on the hysteroscope, removed with pickups and appeared to   be consistent with a polyp, was measured 1.5 cm in length by about 0.5 cm in   width.  The operative hysteroscope was reintroduced.  The cavity was cleaned   out with infusion and suction of fluid and the cavity was then distended   again.  The entire cavity was able to be visualized.  Both left and right   tubal ostia were identified.  Pictures were taken prior to removal and after   removal of the lesion.  The entire uterine cavity was visualized.  There was a   very small stump where the lesion was removed, it was hemostatic.  There were   no other intracavitary lesions, masses or distortions of the endometrial   cavity itself and at this time, the hysteroscope was removed.  The fluid was   recalculated and total deficit was 100 mL. There was a fair amount of fluid in   the tubing as well as some that leaked through the bag and onto the floor.    The bivalve speculum was replaced.  The single tooth tenaculum was removed.    The cervix was visualized.  Silver nitrate was applied  to the single tooth   site and hemostasis was achieved.  There was a very small amount of blood in   the cervical os.  There was no active bleeding. At this time, all instruments   and sponges were removed and the procedure was ended.  Sponge, lap and   instrument counts were correct and the patient was taken to the recovery room   awake and in stable condition.  She will be discharged home when day surgery   criteria met with routine postoperative instructions and followup.  She does   report nausea with codeine, but has had pain medication in the past.  We   discussed preoperatively that we will give her a small amount of pain   medication with Zofran if necessary and she will use ibuprofen.  I discussed   all of this with her  ____ postoperatively, confirmed the pharmacy,   confirmed with him, he knows the potential as well as she does which we   discussed preoperatively of misuse, abuse and addiction to narcotic pain   medications.  The proper usage and disposal of it all discussed.  She   understands her postoperative warning signs and complications and all   questions answered.  She will be discharged home, again when criteria met.    Please note, I will check the ____ prior to prescribing pain medications   through the online system.        ______________________________  MD JORGE LABERTO Falcon/RYLAND/JOSE GUADALUPE    DD:  04/16/2021 12:17  DT:  04/16/2021 13:20    Job#:  [id number]    CC:HEMA RAYMOND MD

## 2021-04-16 NOTE — OR SURGEON
Immediate Post OP Note    PreOp Diagnosis: menorrhagia, fibroids, intracavitary lesion    PostOp Diagnosis: same    Procedure(s):  HYSTEROSCOPY, WITH VIDEO IMAGING - WITH POLYPECTOMY - Wound Class: Clean Contaminated  DILATION AND CURETTAGE. - Wound Class: Clean Contaminated    Surgeon(s):  Susana Barreto M.D.    Anesthesiologist/Type of Anesthesia:  Anesthesiologist: Dilip Muñoz M.D./General    Surgical Staff:  Circulator: YASSINE Julio Circulator: Katia Chin R.N.  Relief Scrub: Fco Tran  Scrub Person: Paul Elizabeth    Specimens removed if any:  ID Type Source Tests Collected by Time Destination   A : uterine mass \ endometrial curettings Tissue Other PATHOLOGY SPECIMEN Susana Barreto M.D. 4/16/2021 10:36 AM        Estimated Blood Loss: minimal    Findings: 1.5cm endometrial lesion appears c/w polyp, otherwise normal intrauterine cavity with no other masses or lesions    Complications: none    Dictated # [id number]    4/16/2021 12:09 PM Susana Barreto M.D.

## 2021-04-16 NOTE — ANESTHESIA POSTPROCEDURE EVALUATION
Patient: Hilary Santos    Procedure Summary     Date: 04/16/21 Room / Location: [facility] ROOM 24 / SURGERY SAME DAY [facility]    Anesthesia Start: 1125 Anesthesia Stop: 1213    Procedures:       HYSTEROSCOPY, WITH VIDEO IMAGING - WITH POLYPECTOMY (N/A Vagina )      DILATION AND CURETTAGE. (N/A Vagina ) Diagnosis: (MENORRHAGIA, ENDOMETRIAL POLYP)    Surgeons: Susana Barreto M.D. Responsible Provider: Dilip Muñoz M.D.    Anesthesia Type: general ASA Status: 1          Final Anesthesia Type: general  Last vitals  BP   Blood Pressure: 135/74    Temp   36.3 °C (97.3 °F)    Pulse   (!) 53   Resp   20    SpO2   100 %      Anesthesia Post Evaluation    Patient location during evaluation: PACU  Patient participation: complete - patient participated  Level of consciousness: awake and alert  Pain score: 2    Airway patency: patent  Anesthetic complications: no  Cardiovascular status: hemodynamically stable  Respiratory status: acceptable and face mask  Hydration status: euvolemic    PONV: none          No complications documented.     Nurse Pain Score: 0 (NPRS)

## 2021-04-16 NOTE — ANESTHESIA TIME REPORT
Anesthesia Start and Stop Event Times    No anesthesia events filed       Responsible Staff    No responsible staff documented.       Preop Diagnosis (Free Text):  Pre-op Diagnosis     MENORRHAGIA, ENDOMETRIAL POLYP        Preop Diagnosis (Codes):    Post op Diagnosis  Menorrhagia      Premium Reason  Non-Premium    Comments:

## 2021-09-20 DIAGNOSIS — J30.1 SEASONAL ALLERGIC RHINITIS DUE TO POLLEN: ICD-10-CM

## 2021-09-21 RX ORDER — MOMETASONE FUROATE 50 UG/1
SPRAY, METERED NASAL
Qty: 17 G | Refills: 2 | Status: SHIPPED | OUTPATIENT
Start: 2021-09-21 | End: 2022-03-14

## 2021-10-11 ENCOUNTER — NON-PROVIDER VISIT (OUTPATIENT)
Dept: MEDICAL GROUP | Facility: MEDICAL CENTER | Age: 46
End: 2021-10-11
Payer: COMMERCIAL

## 2021-10-11 DIAGNOSIS — Z23 NEED FOR VACCINATION: ICD-10-CM

## 2021-10-11 PROCEDURE — 90686 IIV4 VACC NO PRSV 0.5 ML IM: CPT | Performed by: FAMILY MEDICINE

## 2021-10-11 PROCEDURE — 90471 IMMUNIZATION ADMIN: CPT | Performed by: FAMILY MEDICINE

## 2021-10-11 NOTE — PROGRESS NOTES
"Hilary Santos is a 46 y.o. female here for a non-provider visit for:   FLU    Reason for immunization: Annual Flu Vaccine  Immunization records indicate need for vaccine: Yes, confirmed with Epic  Minimum interval has been met for this vaccine: Yes  ABN completed: Not Indicated    VIS Dated  08/06/2021  was given to patient: Yes  All IAC Questionnaire questions were answered \"No.\"    Patient tolerated injection and no adverse effects were observed or reported: Yes    Pt scheduled for next dose in series: Not Indicated    "

## 2022-03-13 DIAGNOSIS — J30.1 SEASONAL ALLERGIC RHINITIS DUE TO POLLEN: ICD-10-CM

## 2022-03-14 RX ORDER — MOMETASONE FUROATE 50 UG/1
SPRAY, METERED NASAL
Qty: 17 G | Refills: 2 | Status: SHIPPED | OUTPATIENT
Start: 2022-03-14 | End: 2022-08-18

## 2022-03-18 PROBLEM — N84.0 ENDOMETRIAL POLYP: Status: ACTIVE | Noted: 2021-03-12

## 2022-05-17 PROBLEM — U07.1 COVID-19 VIRUS INFECTION: Status: ACTIVE | Noted: 2022-05-17

## 2022-06-15 DIAGNOSIS — N95.1 MENOPAUSAL SYMPTOMS: ICD-10-CM

## 2022-06-29 ENCOUNTER — OFFICE VISIT (OUTPATIENT)
Dept: MEDICAL GROUP | Facility: MEDICAL CENTER | Age: 47
End: 2022-06-29
Payer: COMMERCIAL

## 2022-06-29 VITALS
OXYGEN SATURATION: 98 % | TEMPERATURE: 98.8 F | HEART RATE: 75 BPM | SYSTOLIC BLOOD PRESSURE: 108 MMHG | DIASTOLIC BLOOD PRESSURE: 68 MMHG | BODY MASS INDEX: 22.79 KG/M2 | HEIGHT: 65 IN | WEIGHT: 136.8 LBS | RESPIRATION RATE: 14 BRPM

## 2022-06-29 DIAGNOSIS — Z00.00 LABORATORY EXAMINATION ORDERED AS PART OF A ROUTINE GENERAL MEDICAL EXAMINATION: ICD-10-CM

## 2022-06-29 DIAGNOSIS — Z12.39 SCREENING BREAST EXAMINATION: ICD-10-CM

## 2022-06-29 DIAGNOSIS — Z00.00 ROUTINE GENERAL MEDICAL EXAMINATION AT A HEALTH CARE FACILITY: ICD-10-CM

## 2022-06-29 PROBLEM — Z98.890 STATUS POST HYSTEROSCOPY: Status: RESOLVED | Noted: 2021-04-16 | Resolved: 2022-06-29

## 2022-06-29 PROBLEM — G89.18 POST-OPERATIVE PAIN: Status: RESOLVED | Noted: 2021-04-16 | Resolved: 2022-06-29

## 2022-06-29 PROCEDURE — 99396 PREV VISIT EST AGE 40-64: CPT | Performed by: FAMILY MEDICINE

## 2022-06-29 ASSESSMENT — ENCOUNTER SYMPTOMS
DEPRESSION: 0
HALLUCINATIONS: 0
LOSS OF CONSCIOUSNESS: 0
HEADACHES: 0
ORTHOPNEA: 0
TREMORS: 0
ABDOMINAL PAIN: 0
SEIZURES: 0
SHORTNESS OF BREATH: 0
COUGH: 0
MEMORY LOSS: 0
HEARTBURN: 0
NECK PAIN: 0
MYALGIAS: 0
VOMITING: 0
BACK PAIN: 0
SPEECH CHANGE: 0
FOCAL WEAKNESS: 0
NAUSEA: 0
FEVER: 0
DIARRHEA: 0
CHILLS: 0
WEIGHT LOSS: 0
TINGLING: 0
NERVOUS/ANXIOUS: 0
BLURRED VISION: 0
DOUBLE VISION: 0
SORE THROAT: 0
WEAKNESS: 0
WHEEZING: 0
SENSORY CHANGE: 0
PALPITATIONS: 0
DIZZINESS: 0
INSOMNIA: 0
SPUTUM PRODUCTION: 0
BRUISES/BLEEDS EASILY: 0
BLOOD IN STOOL: 0

## 2022-06-29 ASSESSMENT — FIBROSIS 4 INDEX: FIB4 SCORE: 1.07

## 2022-06-29 ASSESSMENT — LIFESTYLE VARIABLES: SUBSTANCE_ABUSE: 0

## 2022-06-29 ASSESSMENT — PATIENT HEALTH QUESTIONNAIRE - PHQ9: CLINICAL INTERPRETATION OF PHQ2 SCORE: 0

## 2022-06-29 NOTE — PROGRESS NOTES
"Subjective     Hilary Santos is a 47 y.o. female who presents with Annual Exam            HPI    Review of Systems   Constitutional: Negative for chills, fever, malaise/fatigue and weight loss.   HENT: Negative for congestion, hearing loss and sore throat.    Eyes: Negative for blurred vision and double vision.        Eye irritation from allergies   Respiratory: Negative for cough, sputum production, shortness of breath and wheezing.    Cardiovascular: Negative for chest pain, palpitations, orthopnea and leg swelling.   Gastrointestinal: Negative for abdominal pain, blood in stool, diarrhea, heartburn, nausea and vomiting.   Genitourinary: Negative for dysuria, frequency, hematuria and urgency.   Musculoskeletal: Negative for back pain, joint pain, myalgias and neck pain.   Skin: Negative for rash.   Neurological: Negative for dizziness, tingling, tremors, sensory change, speech change, focal weakness, seizures, loss of consciousness, weakness and headaches.   Endo/Heme/Allergies: Positive for environmental allergies. Does not bruise/bleed easily.   Psychiatric/Behavioral: Negative for depression, hallucinations, memory loss, substance abuse and suicidal ideas. The patient is not nervous/anxious and does not have insomnia.             Objective     /68 (BP Location: Right arm, Patient Position: Sitting, BP Cuff Size: Adult)   Pulse 75   Temp 37.1 °C (98.8 °F) (Temporal)   Resp 14   Ht 1.651 m (5' 5\")   Wt 62.1 kg (136 lb 12.8 oz)   LMP 06/14/2022 (Approximate)   SpO2 98%   BMI 22.76 kg/m²      Physical Exam  Vitals reviewed.   Constitutional:       Appearance: She is well-developed.   HENT:      Head: Normocephalic and atraumatic.   Eyes:      General:         Left eye: No discharge.      Pupils: Pupils are equal, round, and reactive to light.   Neck:      Thyroid: No thyromegaly.      Vascular: No JVD.   Cardiovascular:      Rate and Rhythm: Normal rate and regular rhythm.      Heart sounds: Normal " heart sounds. No murmur heard.  Pulmonary:      Effort: Pulmonary effort is normal. No respiratory distress.      Breath sounds: Normal breath sounds. No wheezing or rales.   Abdominal:      General: Bowel sounds are normal. There is no distension.      Palpations: Abdomen is soft. There is no mass.      Tenderness: There is no abdominal tenderness.   Musculoskeletal:         General: Normal range of motion.      Cervical back: Normal range of motion and neck supple.   Lymphadenopathy:      Cervical: No cervical adenopathy.   Skin:     General: Skin is warm and dry.      Findings: No erythema or rash.   Neurological:      Mental Status: She is alert and oriented to person, place, and time.      Coordination: Coordination normal.   Psychiatric:         Behavior: Behavior normal.         Assessment & Plan        1. Routine general medical examination at a health care facility  She is generally well.    2. Laboratory examination ordered as part of a routine general medical examination  Routine orders discussed and placed  - Comp Metabolic Panel; Future  - Lipid Profile; Future  - TSH; Future  - CBC WITHOUT DIFFERENTIAL; Future    3. Screening breast examination  Mammogram order discussed and placed  - MA-SCREENING MAMMO BILAT W/TOMOSYNTHESIS W/CAD; Future          recheck one year, sooner prn

## 2022-07-20 ENCOUNTER — HOSPITAL ENCOUNTER (OUTPATIENT)
Dept: LAB | Facility: MEDICAL CENTER | Age: 47
End: 2022-07-20
Attending: FAMILY MEDICINE
Payer: COMMERCIAL

## 2022-07-20 DIAGNOSIS — Z00.00 LABORATORY EXAMINATION ORDERED AS PART OF A ROUTINE GENERAL MEDICAL EXAMINATION: ICD-10-CM

## 2022-07-20 LAB
ALBUMIN SERPL BCP-MCNC: 4.6 G/DL (ref 3.2–4.9)
ALBUMIN/GLOB SERPL: 1.6 G/DL
ALP SERPL-CCNC: 68 U/L (ref 30–99)
ALT SERPL-CCNC: 19 U/L (ref 2–50)
ANION GAP SERPL CALC-SCNC: 13 MMOL/L (ref 7–16)
AST SERPL-CCNC: 29 U/L (ref 12–45)
BILIRUB SERPL-MCNC: 0.5 MG/DL (ref 0.1–1.5)
BUN SERPL-MCNC: 13 MG/DL (ref 8–22)
CALCIUM SERPL-MCNC: 9.1 MG/DL (ref 8.5–10.5)
CHLORIDE SERPL-SCNC: 102 MMOL/L (ref 96–112)
CHOLEST SERPL-MCNC: 182 MG/DL (ref 100–199)
CO2 SERPL-SCNC: 21 MMOL/L (ref 20–33)
CREAT SERPL-MCNC: 0.85 MG/DL (ref 0.5–1.4)
ERYTHROCYTE [DISTWIDTH] IN BLOOD BY AUTOMATED COUNT: 51.4 FL (ref 35.9–50)
FASTING STATUS PATIENT QL REPORTED: NORMAL
GFR SERPLBLD CREATININE-BSD FMLA CKD-EPI: 85 ML/MIN/1.73 M 2
GLOBULIN SER CALC-MCNC: 2.9 G/DL (ref 1.9–3.5)
GLUCOSE SERPL-MCNC: 88 MG/DL (ref 65–99)
HCT VFR BLD AUTO: 45.9 % (ref 37–47)
HDLC SERPL-MCNC: 82 MG/DL
HGB BLD-MCNC: 15.4 G/DL (ref 12–16)
LDLC SERPL CALC-MCNC: 91 MG/DL
MCH RBC QN AUTO: 32.4 PG (ref 27–33)
MCHC RBC AUTO-ENTMCNC: 33.6 G/DL (ref 33.6–35)
MCV RBC AUTO: 96.4 FL (ref 81.4–97.8)
PLATELET # BLD AUTO: 265 K/UL (ref 164–446)
PMV BLD AUTO: 11.1 FL (ref 9–12.9)
POTASSIUM SERPL-SCNC: 4.4 MMOL/L (ref 3.6–5.5)
PROT SERPL-MCNC: 7.5 G/DL (ref 6–8.2)
RBC # BLD AUTO: 4.76 M/UL (ref 4.2–5.4)
SODIUM SERPL-SCNC: 136 MMOL/L (ref 135–145)
TRIGL SERPL-MCNC: 45 MG/DL (ref 0–149)
TSH SERPL DL<=0.005 MIU/L-ACNC: 1.02 UIU/ML (ref 0.38–5.33)
WBC # BLD AUTO: 4.6 K/UL (ref 4.8–10.8)

## 2022-07-20 PROCEDURE — 84443 ASSAY THYROID STIM HORMONE: CPT

## 2022-07-20 PROCEDURE — 36415 COLL VENOUS BLD VENIPUNCTURE: CPT

## 2022-07-20 PROCEDURE — 80061 LIPID PANEL: CPT

## 2022-07-20 PROCEDURE — 80053 COMPREHEN METABOLIC PANEL: CPT

## 2022-07-20 PROCEDURE — 85027 COMPLETE CBC AUTOMATED: CPT

## 2022-08-16 ENCOUNTER — HOSPITAL ENCOUNTER (OUTPATIENT)
Dept: RADIOLOGY | Facility: MEDICAL CENTER | Age: 47
End: 2022-08-16
Attending: FAMILY MEDICINE
Payer: COMMERCIAL

## 2022-08-16 DIAGNOSIS — Z12.31 VISIT FOR SCREENING MAMMOGRAM: ICD-10-CM

## 2022-08-16 PROCEDURE — 77063 BREAST TOMOSYNTHESIS BI: CPT

## 2022-08-18 DIAGNOSIS — J30.1 SEASONAL ALLERGIC RHINITIS DUE TO POLLEN: ICD-10-CM

## 2022-08-18 RX ORDER — MOMETASONE FUROATE 50 UG/1
SPRAY, METERED NASAL
Qty: 17 G | Refills: 2 | Status: SHIPPED | OUTPATIENT
Start: 2022-08-18 | End: 2023-03-10

## 2022-11-07 ENCOUNTER — PHARMACY VISIT (OUTPATIENT)
Dept: PHARMACY | Facility: MEDICAL CENTER | Age: 47
End: 2022-11-07
Payer: COMMERCIAL

## 2022-11-07 PROCEDURE — RXMED WILLOW AMBULATORY MEDICATION CHARGE: Performed by: INTERNAL MEDICINE

## 2022-11-07 RX ORDER — INFLUENZA A VIRUS A/BRISBANE/02/2018 IVR-190 (H1N1) ANTIGEN (FORMALDEHYDE INACTIVATED), INFLUENZA A VIRUS A/KANSAS/14/2017 X-327 (H3N2) ANTIGEN (FORMALDEHYDE INACTIVATED), INFLUENZA B VIRUS B/PHUKET/3073/2013 ANTIGEN (FORMALDEHYDE INACTIVATED), AND INFLUENZA B VIRUS B/MARYLAND/15/2016 BX-69A ANTIGEN (FORMALDEHYDE INACTIVATED) 15; 15; 15; 15 UG/.5ML; UG/.5ML; UG/.5ML; UG/.5ML
INJECTION, SUSPENSION INTRAMUSCULAR
Qty: 0.5 ML | Refills: 0 | Status: SHIPPED | OUTPATIENT
Start: 2022-11-07 | End: 2023-03-10

## 2022-12-02 DIAGNOSIS — F51.05 INSOMNIA SECONDARY TO ANXIETY: ICD-10-CM

## 2022-12-02 DIAGNOSIS — F41.9 INSOMNIA SECONDARY TO ANXIETY: ICD-10-CM

## 2022-12-02 RX ORDER — ALPRAZOLAM 0.5 MG/1
0.5 TABLET ORAL 2 TIMES DAILY PRN
Qty: 10 TABLET | Refills: 0 | Status: SHIPPED | OUTPATIENT
Start: 2022-12-02 | End: 2023-03-10

## 2023-02-15 ENCOUNTER — GYNECOLOGY VISIT (OUTPATIENT)
Dept: OBGYN | Facility: CLINIC | Age: 48
End: 2023-02-15
Payer: COMMERCIAL

## 2023-02-15 ENCOUNTER — HOSPITAL ENCOUNTER (OUTPATIENT)
Facility: MEDICAL CENTER | Age: 48
End: 2023-02-15
Attending: OBSTETRICS & GYNECOLOGY
Payer: COMMERCIAL

## 2023-02-15 VITALS — WEIGHT: 132 LBS | BODY MASS INDEX: 22.53 KG/M2 | HEIGHT: 64 IN

## 2023-02-15 DIAGNOSIS — D21.9 FIBROIDS: ICD-10-CM

## 2023-02-15 DIAGNOSIS — Z12.4 SCREENING FOR CERVICAL CANCER: ICD-10-CM

## 2023-02-15 DIAGNOSIS — Z01.419 WELL WOMAN EXAM WITH ROUTINE GYNECOLOGICAL EXAM: ICD-10-CM

## 2023-02-15 DIAGNOSIS — Z12.39 ENCOUNTER FOR BREAST CANCER SCREENING USING NON-MAMMOGRAM MODALITY: ICD-10-CM

## 2023-02-15 PROCEDURE — 99396 PREV VISIT EST AGE 40-64: CPT | Performed by: OBSTETRICS & GYNECOLOGY

## 2023-02-15 PROCEDURE — 88175 CYTOPATH C/V AUTO FLUID REDO: CPT

## 2023-02-15 PROCEDURE — 87624 HPV HI-RISK TYP POOLED RSLT: CPT

## 2023-02-15 RX ORDER — ALPRAZOLAM 0.5 MG/1
0.5 TABLET ORAL NIGHTLY PRN
COMMUNITY
End: 2023-03-10

## 2023-02-15 ASSESSMENT — FIBROSIS 4 INDEX: FIB4 SCORE: 1.15

## 2023-02-15 NOTE — PROGRESS NOTES
"Hilary Santos is a 47 y.o.  (1 adopted child) female who presents for her Annual Gynecologic Exam      HPI Comments: Pt presents for her annual well woman exam.   Patient's last menstrual period was 2023.  She has no real complaints today. She has a h/o uterine fibroids and heavy menses and had an endometrial polyp diagnosed in  and I performed a hysteroscopy with D&C and polypectomy with benign pathology. She states the bleeding did not change significantly. She continues to have menses every 26 days and are moderately heavy. She denies break through bleeding.   She denies menopausal symptoms.     Mammogram 2022  Dexa scan NI    Review of Systems:   Pertinent positives documented in HPI and all other systems reviewed & are negative    All PMH, PSH, allergies, social history and FH reviewed and updated today:    PGYN Hx:   Cycles every 26 days, bleeding for 3 days.   Menopausal? NO.  Sexually active with one male partner,  Pap smear hx: unsure of date of last pap and we are unfortunately unable to get her records from previous office at this time, denies history of cervical biopsies or excisional procedures.    OB History    Para Term  AB Living   0         0   SAB IAB Ectopic Molar Multiple Live Births                 Obstetric Comments   1 adopted child     Past Medical History:   Diagnosis Date    Bilateral dry eyes     COVID-19 virus infection 2022    Positive home test with symptoms 2022    Depression     History of anemia     per patient: \"Last blood test was good\"    Psychiatric problem     Anxiety    Seasonal allergies     Vitamin D deficiency      Past Surgical History:   Procedure Laterality Date    WI HYSTEROSCOPY,DX,SEP PROC N/A 2021    Procedure: HYSTEROSCOPY, WITH VIDEO IMAGING - WITH POLYPECTOMY;  Surgeon: Susana Barreto M.D.;  Location: SURGERY SAME DAY [facility];  Service: Gynecology    DILATION AND CURETTAGE N/A 2021    Procedure: DILATION " AND CURETTAGE.;  Surgeon: Susana Barreto M.D.;  Location: SURGERY SAME DAY [facility];  Service: Gynecology    APPENDECTOMY  2005    COLONOSCOPY  2001    normal    OTHER      [place] Teeth removal     Medications:   Current Outpatient Medications Ordered in Epic   Medication Sig Dispense Refill    ALPRAZolam (XANAX) 0.5 MG Tab Take 0.5 mg by mouth at bedtime as needed for Sleep.      Ascorbic Acid (VITAMIN C PO) Take 450 mg by mouth every day.      MAGNESIUM PO Take 360 mg by mouth every evening. With dinner      Cholecalciferol (VITAMIN D3) 1.25 MG (00831 UT) Cap Take 50 mcg by mouth every day.      MELATONIN PO Take 5 mg by mouth. (Gummies).  Each night upon awakening.      influenza vaccine quad (FLUZONE QUADRIVALENT) 0.5 ML Suspension Prefilled Syringe injection Inject  into the shoulder, thigh, or buttocks. 0.5 mL 0    COVID-19mRNA Bival Vacc Pfizer (PFIZER COVID-19 BIVALENT) 30 MCG/0.3ML Suspension injection Inject  into the shoulder, thigh, or buttocks. 0.3 mL 0    mometasone (NASONEX) 50 MCG/ACT nasal spray USE 2 SPRAYS IN EACH NOSTRIL ONCE DAILY AS DIRECTED (Patient not taking: Reported on 2/15/2023) 17 g 2    progesterone (PROMETRIUM) 100 MG Cap Take 1 Capsule by mouth at bedtime. Take 1 at bedtime during that part of the cycle as directed by GYN.  10 capsules/month (Patient not taking: Reported on 2/15/2023) 10 Capsule 0    NON SPECIFIED 2 times a day. Floradix Iron Tablets       No current Epic-ordered facility-administered medications on file.     Codeine and Vicodin [hydrocodone-acetaminophen]  Social History     Socioeconomic History    Marital status:    Tobacco Use    Smoking status: Never    Smokeless tobacco: Never   Vaping Use    Vaping Use: Never used   Substance and Sexual Activity    Alcohol use: Yes     Alcohol/week: 2.4 oz     Types: 4 Standard drinks or equivalent per week     Comment: 3/week    Drug use: No    Sexual activity: Yes     Partners: Male     Family History   Problem  "Relation Age of Onset    Hypertension Mother         aneurysm 51, did not pass    Diabetes Mother     Hypertension Father         stroke 52,     Cancer Maternal Grandmother         breast 60s          Objective:   Vital measurements:  Ht 5' 4\"   Wt 132 lb   LMP 2023   BMI 22.66 kg/m²   Body mass index is 22.66 kg/m². (Goal BM I>18 <25)    Physical Exam   Nursing note and vitals reviewed.  Constitutional: She is oriented to person, place, and time. She appears well-developed and well-nourished. No distress.     HEENT:   Head: Normocephalic and atraumatic.     Neck: Neck supple. No thyromegaly present. No cervical or supraclavicular lymphadenopathy.    Pulmonary/Chest: Effort normal and breath sounds normal. No respiratory distress.   Cardiovascular: Regular, rate and rhythm. No edema.    Breast: Symmetrical, normal consistency without masses., No dimpling or skin changes, Normal nipples without discharge, no axillary lymphadenopathy, No masses palpated    Abdominal: Soft. Bowel sounds are normal. She exhibits no distension and no mass. No tenderness. She has no rebound and no guarding.     Genitourinary:  Pelvic exam was performed with patient supine.  External genitalia with no abnormal pigmentation, labial fusion, rash, tenderness, lesion or injury to the labia bilaterally.  Vagina is  with no lesions, foul discharge, erythema, tenderness or bleeding. No foreign body around the vagina or signs of injury.   Cervix exhibits no motion tenderness, no discharge and no friability, no lesions.   Uterus is slightly enlarged and irregular in shape, not deviated, not fixed and not tender.  Right adnexa displays no mass, no tenderness and no fullness.  Left adnexa displays no mass, no tenderness and no fullness.     Musculoskeletal: Normal range of motion. Non tender. She exhibits no edema and no tenderness.     Lymphadenopathy: She has no cervical or supraclavicular adenopathy.     Neurological: She is alert " and oriented to person, place, and time. She exhibits normal muscle tone.     Skin: Skin is warm and dry. No rash noted. She is not diaphoretic. No erythema. No pallor.     Psychiatric: She has a normal mood and affect. Her behavior is normal. Judgment and thought content normal.        Assessment:     1. Well woman exam with routine gynecological exam        2. Screening for cervical cancer        3. Encounter for breast cancer screening using non-mammogram modality        4. Fibroids              Plan:     #Well Woman  - Pap and physical exam performed. Discuss pap smear screening guidelines.   - Self breast awareness discussed.  - Counseling: breast self exam, mammography screening, menopause, osteoporosis, and adequate intake of calcium and vitamin D  - Encouraged exercise and proper diet.  - Mammograms annually.   - See medications and orders placed in encounter report.  - Weight bearing exercise daily to strengthen bones  - Calcium 1200mg daily and Vitamin D 800 IU daily   - Pelvic US ordered to follow known uterine fibroids

## 2023-02-16 LAB
CYTOLOGY REG CYTOL: NORMAL
HPV HR 12 DNA CVX QL NAA+PROBE: NEGATIVE
HPV16 DNA SPEC QL NAA+PROBE: NEGATIVE
HPV18 DNA SPEC QL NAA+PROBE: NEGATIVE
SPECIMEN SOURCE: NORMAL

## 2023-03-10 ENCOUNTER — OFFICE VISIT (OUTPATIENT)
Dept: MEDICAL GROUP | Facility: MEDICAL CENTER | Age: 48
End: 2023-03-10
Payer: COMMERCIAL

## 2023-03-10 VITALS
HEART RATE: 54 BPM | HEIGHT: 64 IN | TEMPERATURE: 97.5 F | BODY MASS INDEX: 23.22 KG/M2 | OXYGEN SATURATION: 100 % | SYSTOLIC BLOOD PRESSURE: 124 MMHG | WEIGHT: 136 LBS | DIASTOLIC BLOOD PRESSURE: 74 MMHG

## 2023-03-10 DIAGNOSIS — F51.05 INSOMNIA SECONDARY TO ANXIETY: ICD-10-CM

## 2023-03-10 DIAGNOSIS — F40.243 ANXIETY WITH FLYING: ICD-10-CM

## 2023-03-10 DIAGNOSIS — D50.0 IRON DEFICIENCY ANEMIA DUE TO CHRONIC BLOOD LOSS: ICD-10-CM

## 2023-03-10 DIAGNOSIS — F41.9 INSOMNIA SECONDARY TO ANXIETY: ICD-10-CM

## 2023-03-10 DIAGNOSIS — N92.1 MENOMETRORRHAGIA: ICD-10-CM

## 2023-03-10 PROBLEM — U07.1 COVID-19 VIRUS INFECTION: Status: RESOLVED | Noted: 2022-05-17 | Resolved: 2023-03-10

## 2023-03-10 PROBLEM — Z86.16 HISTORY OF 2019 NOVEL CORONAVIRUS DISEASE (COVID-19): Status: ACTIVE | Noted: 2023-03-10

## 2023-03-10 PROCEDURE — 99214 OFFICE O/P EST MOD 30 MIN: CPT | Performed by: FAMILY MEDICINE

## 2023-03-10 RX ORDER — ALPRAZOLAM 0.5 MG/1
0.5 TABLET ORAL 2 TIMES DAILY PRN
Qty: 10 TABLET | Refills: 0 | Status: SHIPPED | OUTPATIENT
Start: 2023-03-10 | End: 2023-03-15

## 2023-03-10 RX ORDER — UBIDECARENONE 75 MG
100 CAPSULE ORAL DAILY
COMMUNITY

## 2023-03-10 RX ORDER — PYRIDOXINE HCL (VITAMIN B6) 50 MG
50 TABLET ORAL DAILY
COMMUNITY

## 2023-03-10 ASSESSMENT — PATIENT HEALTH QUESTIONNAIRE - PHQ9: CLINICAL INTERPRETATION OF PHQ2 SCORE: 0

## 2023-03-10 ASSESSMENT — FIBROSIS 4 INDEX: FIB4 SCORE: 1.15

## 2023-03-10 NOTE — PROGRESS NOTES
Chief Complaint   Patient presents with    Medication Refill    Hypertension     Pt reports occasional elevated BP levels at home.        Subjective:     HPI:   Hilary Santos presents today with the followin. Anxiety with flying/Insomnia secondary to anxiety  Patient has history of significant flying anxiety.  She is scheduled to take a long trip with several plane changes.  She also finds it difficult to sleep out of her regular environment.  In the past she has used alprazolam to help her with these issues with good success.  PDMP review does show that she was prescribed alprazolam before quite a while ago.  No other controlled substances present on the PDMP.  No history of risky behavior with controlled substances.  Renewal is sent.    2. Iron deficiency anemia due to chronic blood loss  Patient does have history of anemia in 2020 with highly significant low iron findings.  It took over a year for her iron to normalize.  She stopped the iron replacement due to GI side effects and also she had normalized with no anemia.  However, she is beginning to feel quite fatigued again and is having some similar symptoms including occasional tachycardia.  Discussed and agreed on rechecking CBC and iron.  Patient does have history of significantly heavy periods which are probably contributing to her iron deficiency    3. Menometrorrhagia  Patient has shortening of her regular menstrual cycle.  She used to be 28 to about 32 days and now is 25 days and sometimes even sooner.  The flow is heavier than its been in the past.  Discussed other perimenopausal symptoms she is beginning to have.  Hormone lab orders discussed and placed.    4.  Occasional elevated blood pressure   at home her pressure has been as high as 140/90, usually 130s/80s or better.  Uses an arm cuff.  Last night it was fine.  With stress 125/91.  Have asked her to check at least once weekly and let us know if  her pressure becomes persistently  "high.    Patient Active Problem List    Diagnosis Date Noted    History of 2019 novel coronavirus disease (COVID-19) 03/10/2023    Anxiety with flying 03/10/2023    Insomnia secondary to anxiety 03/10/2023    Iron deficiency anemia due to chronic blood loss 03/10/2023    Menometrorrhagia 03/10/2023    Fibroids 02/15/2023    Endometrial polyp 03/12/2021    Carpal tunnel syndrome on both sides 04/26/2019    Situational anxiety 10/22/2018    Fear of flying 10/22/2018    Shotty lymph nodes 04/25/2017    Sacral back pain 04/25/2017    Vitamin D deficiency     Psychophysiologic insomnia 03/25/2016    Bilateral dry eyes     Seasonal allergies        Current medicines (including changes today)  Current Outpatient Medications   Medication Sig Dispense Refill    cyanocobalamin (VITAMIN B-12) 100 MCG Tab Take 100 mcg by mouth every day.      pyridoxine (VITAMIN B-6) 50 MG Tab Take 50 mg by mouth every day.      COD LIVER OIL PO Take  by mouth.      ALPRAZolam (XANAX) 0.5 MG Tab Take 1 Tablet by mouth 2 times a day as needed for Sleep or Anxiety (flying anxiety) for up to 5 days. 10 Tablet 0    NON SPECIFIED 2 times a day. Floradix Iron Tablets      Ascorbic Acid (VITAMIN C PO) Take 450 mg by mouth every day.      MAGNESIUM PO Take 900 mg by mouth every evening. With dinner      Cholecalciferol (VITAMIN D3) 1.25 MG (59240 UT) Cap Take 50 mcg by mouth every day.       No current facility-administered medications for this visit.       Allergies   Allergen Reactions    Codeine Nausea    Vicodin [Hydrocodone-Acetaminophen] Nausea       ROS: As per HPI  denies chest pain or shortness of breath       Objective:     /74 (BP Location: Right arm, Patient Position: Sitting, BP Cuff Size: Small adult)   Pulse (!) 54   Temp 36.4 °C (97.5 °F) (Temporal)   Ht 1.626 m (5' 4\")   Wt 61.7 kg (136 lb)   SpO2 100%  Body mass index is 23.34 kg/m².    Physical Exam:  Constitutional: Well-developed and well-nourished. Not diaphoretic. No " distress. Lucid and fluent.  Patient, physician and staff all wearing masks.  Skin: Skin is warm and dry. No rash noted.  Head: Atraumatic without lesions.  Eyes: Conjunctivae and extraocular motions are normal. Pupils are equal, round, and reactive to light. No scleral icterus.   Ears:  External ears unremarkable.   Neck: Supple, trachea midline. No thyromegaly present. No cervical or supraclavicular lymphadenopathy. No JVD or carotid bruits appreciated  Cardiovascular: Regular rate and rhythm.  Normal S1, S2 without murmur appreciated.  Chest: Effort normal. Clear to auscultation throughout. No adventitious sounds.   Extremities: No cyanosis, clubbing, erythema, nor edema.   Neurological: Alert and oriented x 3. No tremor noted.  Psychiatric:  Behavior, mood, and affect are appropriate.       Assessment and Plan:     47 y.o. female with the following issues:    1. Anxiety with flying  ALPRAZolam (XANAX) 0.5 MG Tab      2. Insomnia secondary to anxiety  ALPRAZolam (XANAX) 0.5 MG Tab      3. Iron deficiency anemia due to chronic blood loss  CBC WITH DIFFERENTIAL    IRON/TOTAL IRON BIND      4. Menometrorrhagia  FSH/LH            Followup: Return in about 6 months (around 9/10/2023), or if symptoms worsen or fail to improve.

## 2023-03-22 ENCOUNTER — HOSPITAL ENCOUNTER (OUTPATIENT)
Dept: RADIOLOGY | Facility: MEDICAL CENTER | Age: 48
End: 2023-03-22
Attending: OBSTETRICS & GYNECOLOGY
Payer: COMMERCIAL

## 2023-03-22 DIAGNOSIS — D21.9 FIBROIDS: ICD-10-CM

## 2023-03-22 PROCEDURE — 76830 TRANSVAGINAL US NON-OB: CPT

## 2023-03-24 DIAGNOSIS — N83.201 RIGHT OVARIAN CYST: ICD-10-CM

## 2023-03-28 ENCOUNTER — GYNECOLOGY VISIT (OUTPATIENT)
Dept: OBGYN | Facility: CLINIC | Age: 48
End: 2023-03-28
Payer: COMMERCIAL

## 2023-03-28 VITALS
BODY MASS INDEX: 22.86 KG/M2 | SYSTOLIC BLOOD PRESSURE: 119 MMHG | WEIGHT: 137.2 LBS | DIASTOLIC BLOOD PRESSURE: 77 MMHG | HEIGHT: 65 IN

## 2023-03-28 DIAGNOSIS — N83.209 CYST OF OVARY, UNSPECIFIED LATERALITY: Primary | ICD-10-CM

## 2023-03-28 DIAGNOSIS — N93.9 ABNORMAL UTERINE BLEEDING (AUB): ICD-10-CM

## 2023-03-28 PROCEDURE — 99213 OFFICE O/P EST LOW 20 MIN: CPT | Performed by: OBSTETRICS & GYNECOLOGY

## 2023-03-28 RX ORDER — PROGESTERONE 100 MG/1
100 CAPSULE ORAL DAILY
Qty: 20 CAPSULE | Refills: 2 | Status: SHIPPED | OUTPATIENT
Start: 2023-03-28 | End: 2023-04-07

## 2023-03-28 ASSESSMENT — FIBROSIS 4 INDEX: FIB4 SCORE: 1.15

## 2023-03-28 NOTE — PROGRESS NOTES
"Subjective     Hilary Santos is a 47 y.o. female who presents with Gynecologic Exam (Ultra sound fu)    CC: US follow up    HPI: Hilary is a 46yo G0 who present for US follow up. She is Dr. Barreto's patient. She has a lot of anxiety regarding her ultrasound report as a 2.1 cm heterogeneous mass was seen within the right ovary.  Radiology recommended follow-up.  She has a lot of anxiety because her father-in-law was recently diagnosed with pancreatic cancer.  Ultrasound also confirmed multiple fibroids.  She has previously been on Prometrium 100 mg daily for 10 days in the luteal phase of her cycle.  She is unsure if that regimen helped in the past, but she is thinking she is interested in trying it again to see if it helps with her heavy cycles.  Her cycles are regular, occurring about every 26 to 28 days and heavy.            Gynecologic Exam      ROS           Objective     /77 (BP Location: Left arm, Patient Position: Sitting, BP Cuff Size: Adult)   Ht 5' 5\"   Wt 137 lb 3.2 oz   LMP 02/28/2023 (Approximate)   BMI 22.83 kg/m²      Physical Exam  Constitutional:       Appearance: Normal appearance.   HENT:      Head: Normocephalic and atraumatic.   Eyes:      Extraocular Movements: Extraocular movements intact.   Pulmonary:      Effort: Pulmonary effort is normal. No respiratory distress.   Neurological:      Mental Status: She is alert.                US 3/22/2023:  HISTORY/REASON FOR EXAM:  follow up known uterine fibroids        TECHNIQUE/EXAM DESCRIPTION:  Transabdominal and transvaginal pelvic ultrasound.     COMPARISON:   8/31/2020     FINDINGS:  Both transabdominal and transvaginal scanning were performed to optimally visualize the pelvis.     UTERUS:  The uterus measures 5.23 cm x 9.47 cm x 5.97 cm.  The uterine myometrium is heterogeneous.     There is a 2.2 x 2.2 x 1.3 cm mass within the anterior body/fundus of the uterus consistent with fibroid. Previously measured 2.1 x 2.2 x 1.4 cm.   "   There is a 3.0 x 2.8 x 2.1 cm mass posterior lower uterine segment consistent with fibroid. Not previously demonstrated.     There is a 2.0 x 1.8 x 1.6 cm mass posterior body of the uterus consistent with fibroid. Not previously demonstrated.     The endometrial echo complex measures 0.74 cm.     OVARIES:  The right ovary measures 3.18 cm x 1.98 cm x 2.12 cm. Duplex Doppler examination of the right ovary shows normal waveforms.     There is a 2.1 x 2.0 x 1.8 cm heterogeneous mass within the right ovary.     The left ovary measures 0.96 cm x 1.48 cm x 1.18 cm. Duplex Doppler examination of the left ovary shows normal waveforms.           There is no free fluid seen.     IMPRESSION:     1.  Heterogeneous uterus containing multiple fibroids.     2.  2.1 cm heterogeneous mass within the right ovary. Neoplasm not excluded. Follow-up recommended.      Assessment & Plan       Hilary was seen today for gynecologic exam.    Diagnoses and all orders for this visit:    Cyst of ovary, unspecified laterality  - I discussed that given her age, the ovarian cyst is unlikely to be cancerous. However, since radiology recommended follow-up, it is best to follow-up the cyst.  She understands that every month women form a corpus luteal cyst after ovulation and that many times ovarian cysts are benign and resolve on their own.  This cyst did not appear to be a corpus luteal cyst, but ovarian cysts are common in women. I also discussed that if the cyst becomes 4 cm or greater that sometimes this can cause more pain and be more bothersome, and sometimes this require surgery even if it is benign. Repeat US ordered and patient plans to repeat US in 6 weeks.  -     US-PELVIC COMPLETE (TRANSABDOMINAL/TRANSVAGINAL) (COMBO); Future    Abnormal uterine bleeding (AUB)  - Briefly discussed hormonal options to help with her cycles.  She is interested in starting Prometrium 100 mg daily during the luteal phase of her cycle as she has tried this in  the past and would like to try it again.  -     progesterone (PROMETRIUM) 100 MG Cap; Take 1 Capsule by mouth every day for 10 days. Take on the last 10 days of your cycle.    Plan: Follow up with Dr. Barreto after US.    Total Time: 20 minutes spent in chart review, exam, counseling and documention    Arabella Barajas M.D.

## 2023-05-05 ENCOUNTER — APPOINTMENT (OUTPATIENT)
Dept: RADIOLOGY | Facility: MEDICAL CENTER | Age: 48
End: 2023-05-05
Attending: OBSTETRICS & GYNECOLOGY
Payer: COMMERCIAL

## 2023-05-26 ENCOUNTER — HOSPITAL ENCOUNTER (OUTPATIENT)
Dept: RADIOLOGY | Facility: MEDICAL CENTER | Age: 48
End: 2023-05-26
Attending: OBSTETRICS & GYNECOLOGY
Payer: COMMERCIAL

## 2023-05-26 DIAGNOSIS — N83.201 RIGHT OVARIAN CYST: ICD-10-CM

## 2023-05-26 PROCEDURE — 76830 TRANSVAGINAL US NON-OB: CPT

## 2023-08-31 ENCOUNTER — HOSPITAL ENCOUNTER (OUTPATIENT)
Dept: LAB | Facility: MEDICAL CENTER | Age: 48
End: 2023-08-31
Attending: FAMILY MEDICINE
Payer: COMMERCIAL

## 2023-08-31 DIAGNOSIS — D50.0 IRON DEFICIENCY ANEMIA DUE TO CHRONIC BLOOD LOSS: ICD-10-CM

## 2023-08-31 DIAGNOSIS — N92.1 MENOMETRORRHAGIA: ICD-10-CM

## 2023-08-31 LAB
BASOPHILS # BLD AUTO: 1.5 % (ref 0–1.8)
BASOPHILS # BLD: 0.08 K/UL (ref 0–0.12)
EOSINOPHIL # BLD AUTO: 0.13 K/UL (ref 0–0.51)
EOSINOPHIL NFR BLD: 2.5 % (ref 0–6.9)
ERYTHROCYTE [DISTWIDTH] IN BLOOD BY AUTOMATED COUNT: 49.1 FL (ref 35.9–50)
FSH SERPL-ACNC: 8.1 MIU/ML
HCT VFR BLD AUTO: 43.5 % (ref 37–47)
HGB BLD-MCNC: 14 G/DL (ref 12–16)
IMM GRANULOCYTES # BLD AUTO: 0.01 K/UL (ref 0–0.11)
IMM GRANULOCYTES NFR BLD AUTO: 0.2 % (ref 0–0.9)
IRON SATN MFR SERPL: 35 % (ref 15–55)
IRON SERPL-MCNC: 129 UG/DL (ref 40–170)
LH SERPL-ACNC: 5.9 IU/L
LYMPHOCYTES # BLD AUTO: 1.37 K/UL (ref 1–4.8)
LYMPHOCYTES NFR BLD: 26.2 % (ref 22–41)
MCH RBC QN AUTO: 31.4 PG (ref 27–33)
MCHC RBC AUTO-ENTMCNC: 32.2 G/DL (ref 32.2–35.5)
MCV RBC AUTO: 97.5 FL (ref 81.4–97.8)
MONOCYTES # BLD AUTO: 0.47 K/UL (ref 0–0.85)
MONOCYTES NFR BLD AUTO: 9 % (ref 0–13.4)
NEUTROPHILS # BLD AUTO: 3.17 K/UL (ref 1.82–7.42)
NEUTROPHILS NFR BLD: 60.6 % (ref 44–72)
NRBC # BLD AUTO: 0 K/UL
NRBC BLD-RTO: 0 /100 WBC (ref 0–0.2)
PLATELET # BLD AUTO: 277 K/UL (ref 164–446)
PMV BLD AUTO: 11.4 FL (ref 9–12.9)
RBC # BLD AUTO: 4.46 M/UL (ref 4.2–5.4)
TIBC SERPL-MCNC: 370 UG/DL (ref 250–450)
UIBC SERPL-MCNC: 241 UG/DL (ref 110–370)
WBC # BLD AUTO: 5.2 K/UL (ref 4.8–10.8)

## 2023-08-31 PROCEDURE — 83550 IRON BINDING TEST: CPT

## 2023-08-31 PROCEDURE — 85025 COMPLETE CBC W/AUTO DIFF WBC: CPT

## 2023-08-31 PROCEDURE — 83540 ASSAY OF IRON: CPT

## 2023-08-31 PROCEDURE — 83001 ASSAY OF GONADOTROPIN (FSH): CPT

## 2023-08-31 PROCEDURE — 83002 ASSAY OF GONADOTROPIN (LH): CPT

## 2023-08-31 PROCEDURE — 36415 COLL VENOUS BLD VENIPUNCTURE: CPT

## 2023-09-07 ENCOUNTER — PATIENT MESSAGE (OUTPATIENT)
Dept: MEDICAL GROUP | Facility: MEDICAL CENTER | Age: 48
End: 2023-09-07
Payer: COMMERCIAL

## 2023-09-07 DIAGNOSIS — F41.9 INSOMNIA SECONDARY TO ANXIETY: ICD-10-CM

## 2023-09-07 DIAGNOSIS — F40.243 FEAR OF FLYING: ICD-10-CM

## 2023-09-07 DIAGNOSIS — F51.05 INSOMNIA SECONDARY TO ANXIETY: ICD-10-CM

## 2023-09-07 RX ORDER — ALPRAZOLAM 1 MG/1
1 TABLET ORAL 2 TIMES DAILY PRN
Qty: 10 TABLET | Refills: 0 | Status: SHIPPED | OUTPATIENT
Start: 2023-09-07 | End: 2023-09-14

## 2023-09-07 RX ORDER — ALPRAZOLAM 1 MG/1
1 TABLET ORAL NIGHTLY PRN
COMMUNITY
End: 2023-09-07 | Stop reason: SDUPTHER

## 2023-09-07 NOTE — PROGRESS NOTES
PDMP review shows no inconsistencies.  Patient has fear of flying and has an upcoming flight.  Prescription for 10 tablets is sent to her pharmacy.  No adverse events have been reported or observed.

## 2023-11-03 RX ORDER — PROGESTERONE 100 MG/1
100 CAPSULE ORAL DAILY
Qty: 18 CAPSULE | Refills: 11 | Status: SHIPPED | OUTPATIENT
Start: 2023-11-03

## 2023-11-06 ENCOUNTER — HOSPITAL ENCOUNTER (OUTPATIENT)
Dept: LAB | Facility: MEDICAL CENTER | Age: 48
End: 2023-11-06
Attending: FAMILY MEDICINE
Payer: COMMERCIAL

## 2023-11-06 ENCOUNTER — OFFICE VISIT (OUTPATIENT)
Dept: MEDICAL GROUP | Facility: MEDICAL CENTER | Age: 48
End: 2023-11-06
Payer: COMMERCIAL

## 2023-11-06 VITALS
SYSTOLIC BLOOD PRESSURE: 124 MMHG | DIASTOLIC BLOOD PRESSURE: 86 MMHG | HEIGHT: 65 IN | HEART RATE: 85 BPM | WEIGHT: 131.84 LBS | TEMPERATURE: 98 F | OXYGEN SATURATION: 99 % | BODY MASS INDEX: 21.97 KG/M2 | RESPIRATION RATE: 18 BRPM

## 2023-11-06 DIAGNOSIS — R19.5 INCREASED MUCUS IN STOOL: ICD-10-CM

## 2023-11-06 DIAGNOSIS — R19.7 DIARRHEA, UNSPECIFIED TYPE: ICD-10-CM

## 2023-11-06 LAB
ALBUMIN SERPL BCP-MCNC: 4.6 G/DL (ref 3.2–4.9)
ALBUMIN/GLOB SERPL: 1.5 G/DL
ALP SERPL-CCNC: 71 U/L (ref 30–99)
ALT SERPL-CCNC: 15 U/L (ref 2–50)
ANION GAP SERPL CALC-SCNC: 13 MMOL/L (ref 7–16)
AST SERPL-CCNC: 19 U/L (ref 12–45)
BASOPHILS # BLD AUTO: 0.7 % (ref 0–1.8)
BASOPHILS # BLD: 0.04 K/UL (ref 0–0.12)
BILIRUB SERPL-MCNC: 0.2 MG/DL (ref 0.1–1.5)
BUN SERPL-MCNC: 8 MG/DL (ref 8–22)
CALCIUM ALBUM COR SERPL-MCNC: 8.6 MG/DL (ref 8.5–10.5)
CALCIUM SERPL-MCNC: 9.1 MG/DL (ref 8.5–10.5)
CHLORIDE SERPL-SCNC: 103 MMOL/L (ref 96–112)
CO2 SERPL-SCNC: 22 MMOL/L (ref 20–33)
CREAT SERPL-MCNC: 0.88 MG/DL (ref 0.5–1.4)
EOSINOPHIL # BLD AUTO: 0.02 K/UL (ref 0–0.51)
EOSINOPHIL NFR BLD: 0.3 % (ref 0–6.9)
ERYTHROCYTE [DISTWIDTH] IN BLOOD BY AUTOMATED COUNT: 45.7 FL (ref 35.9–50)
GFR SERPLBLD CREATININE-BSD FMLA CKD-EPI: 81 ML/MIN/1.73 M 2
GLOBULIN SER CALC-MCNC: 3.1 G/DL (ref 1.9–3.5)
GLUCOSE SERPL-MCNC: 80 MG/DL (ref 65–99)
HCT VFR BLD AUTO: 46.7 % (ref 37–47)
HGB BLD-MCNC: 15.7 G/DL (ref 12–16)
IMM GRANULOCYTES # BLD AUTO: 0.01 K/UL (ref 0–0.11)
IMM GRANULOCYTES NFR BLD AUTO: 0.2 % (ref 0–0.9)
LYMPHOCYTES # BLD AUTO: 0.63 K/UL (ref 1–4.8)
LYMPHOCYTES NFR BLD: 10.8 % (ref 22–41)
MCH RBC QN AUTO: 30.5 PG (ref 27–33)
MCHC RBC AUTO-ENTMCNC: 33.6 G/DL (ref 32.2–35.5)
MCV RBC AUTO: 90.9 FL (ref 81.4–97.8)
MONOCYTES # BLD AUTO: 0.45 K/UL (ref 0–0.85)
MONOCYTES NFR BLD AUTO: 7.7 % (ref 0–13.4)
NEUTROPHILS # BLD AUTO: 4.66 K/UL (ref 1.82–7.42)
NEUTROPHILS NFR BLD: 80.3 % (ref 44–72)
NRBC # BLD AUTO: 0 K/UL
NRBC BLD-RTO: 0 /100 WBC (ref 0–0.2)
PLATELET # BLD AUTO: 268 K/UL (ref 164–446)
PMV BLD AUTO: 10.9 FL (ref 9–12.9)
POTASSIUM SERPL-SCNC: 4 MMOL/L (ref 3.6–5.5)
PROT SERPL-MCNC: 7.7 G/DL (ref 6–8.2)
RBC # BLD AUTO: 5.14 M/UL (ref 4.2–5.4)
SODIUM SERPL-SCNC: 138 MMOL/L (ref 135–145)
WBC # BLD AUTO: 5.8 K/UL (ref 4.8–10.8)

## 2023-11-06 PROCEDURE — 80053 COMPREHEN METABOLIC PANEL: CPT

## 2023-11-06 PROCEDURE — 36415 COLL VENOUS BLD VENIPUNCTURE: CPT

## 2023-11-06 PROCEDURE — 3079F DIAST BP 80-89 MM HG: CPT | Performed by: FAMILY MEDICINE

## 2023-11-06 PROCEDURE — 85025 COMPLETE CBC W/AUTO DIFF WBC: CPT

## 2023-11-06 PROCEDURE — 3074F SYST BP LT 130 MM HG: CPT | Performed by: FAMILY MEDICINE

## 2023-11-06 PROCEDURE — 87046 STOOL CULTR AEROBIC BACT EA: CPT

## 2023-11-06 PROCEDURE — 87077 CULTURE AEROBIC IDENTIFY: CPT

## 2023-11-06 PROCEDURE — 99213 OFFICE O/P EST LOW 20 MIN: CPT | Performed by: FAMILY MEDICINE

## 2023-11-06 PROCEDURE — 87045 FECES CULTURE AEROBIC BACT: CPT

## 2023-11-06 PROCEDURE — 87899 AGENT NOS ASSAY W/OPTIC: CPT

## 2023-11-06 RX ORDER — CIPROFLOXACIN 500 MG/1
500 TABLET, FILM COATED ORAL 2 TIMES DAILY
Qty: 10 TABLET | Refills: 0 | Status: SHIPPED | OUTPATIENT
Start: 2023-11-06 | End: 2023-11-11

## 2023-11-06 ASSESSMENT — FIBROSIS 4 INDEX: FIB4 SCORE: 1.13

## 2023-11-07 NOTE — PROGRESS NOTES
Chief Complaint   Patient presents with    Follow-Up    GI Problem    Diarrhea    Dysmenorrhea    Muscle Pain       Subjective:     HPI:   Hilary Santos presents today with the followin. Increased mucus in stool/Diarrhea, unspecified type  Symptoms began wednesday with mucus and diarrhea.  No visible blood.  Has had some chills but no fever.  Muscle aches.  Cramping and general tenderness in the abdomen.  Starting menstrual cramps also.  No definite trigger or exposure.  However dog is having diarrhea as well.        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 10 Tablet 0    progesterone (PROMETRIUM) 100 MG Cap Take 1 Capsule by mouth every day. Take for 14-18 days per cycle 18 Capsule 11    cyanocobalamin (VITAMIN B-12) 100 MCG Tab Take 100 mcg by mouth every day.      pyridoxine (VITAMIN B-6) 50 MG Tab Take 50 mg by mouth every day.      COD LIVER OIL PO Take  by mouth.      NON SPECIFIED 2 times a day. Floradix Iron Tablets      Ascorbic Acid (VITAMIN C PO) Take 450 mg by mouth every day.      MAGNESIUM PO Take 900 mg by mouth every evening. With dinner       "Cholecalciferol (VITAMIN D3) 1.25 MG (35466 UT) Cap Take 50 mcg by mouth every day.       No current facility-administered medications for this visit.       Allergies   Allergen Reactions    Codeine Nausea    Vicodin [Hydrocodone-Acetaminophen] Nausea       ROS: As per HPI       Objective:     /86   Pulse 85   Temp 36.7 °C (98 °F)   Resp 18   Ht 1.651 m (5' 5\")   Wt 59.8 kg (131 lb 13.4 oz)   SpO2 99%  Body mass index is 21.94 kg/m².    Physical Exam:  Constitutional: Well-developed and well-nourished. Not diaphoretic. No distress. Lucid and fluent.  Skin: Skin is warm and dry. No rash noted.  Head: Atraumatic without lesions.  Eyes: Conjunctivae and extraocular motions are normal. Pupils are equal, round, and reactive to light. No scleral icterus.   Ears:  External ears unremarkable.   Mouth/Throat: Tongue normal. Oropharynx is clear and moist. Posterior pharynx without erythema or exudates.  Neck: Supple, trachea midline. No thyromegaly present. No cervical or supraclavicular lymphadenopathy. No JVD or carotid bruits appreciated  Cardiovascular: Regular rate and rhythm.  Normal S1, S2 without murmur appreciated.  Chest: Effort normal. Clear to auscultation throughout. No adventitious sounds.   Abdomen: Soft, diffusely tender, especially in left lower quadrant.  No distention. Active bowel sounds in all four quadrants. No rebound, guarding, masses or hepatosplenomegaly.  Extremities: No cyanosis, clubbing, erythema, nor edema.   Neurological: Alert and oriented x 3.   Psychiatric:  Behavior, mood, and affect are appropriate.       Assessment and Plan:     48 y.o. female with the following issues:    1. Increased mucus in stool  ciprofloxacin (CIPRO) 500 MG Tab    CBC WITH DIFFERENTIAL    Comp Metabolic Panel    CULTURE STOOL      2. Diarrhea, unspecified type  CBC WITH DIFFERENTIAL    Comp Metabolic Panel    CULTURE STOOL            Followup: Return if symptoms worsen or fail to improve.  "

## 2023-11-08 LAB
E COLI SXT1+2 STL IA: NORMAL
SIGNIFICANT IND 70042: NORMAL
SITE SITE: NORMAL
SOURCE SOURCE: NORMAL

## 2023-11-11 LAB
BACTERIA STL CULT: ABNORMAL
BACTERIA STL CULT: ABNORMAL
E COLI SXT1+2 STL IA: ABNORMAL
SIGNIFICANT IND 70042: ABNORMAL
SITE SITE: ABNORMAL
SOURCE SOURCE: ABNORMAL

## 2024-11-25 ENCOUNTER — OFFICE VISIT (OUTPATIENT)
Dept: MEDICAL GROUP | Facility: MEDICAL CENTER | Age: 49
End: 2024-11-25
Payer: COMMERCIAL

## 2024-11-25 VITALS
DIASTOLIC BLOOD PRESSURE: 74 MMHG | WEIGHT: 137.9 LBS | HEIGHT: 65 IN | HEART RATE: 82 BPM | TEMPERATURE: 97.3 F | BODY MASS INDEX: 22.98 KG/M2 | OXYGEN SATURATION: 94 % | SYSTOLIC BLOOD PRESSURE: 130 MMHG

## 2024-11-25 DIAGNOSIS — J06.9 ACUTE URI: ICD-10-CM

## 2024-11-25 DIAGNOSIS — J32.9 SINUSITIS, UNSPECIFIED CHRONICITY, UNSPECIFIED LOCATION: ICD-10-CM

## 2024-11-25 PROCEDURE — 99213 OFFICE O/P EST LOW 20 MIN: CPT | Performed by: BEHAVIOR ANALYST

## 2024-11-25 PROCEDURE — 3078F DIAST BP <80 MM HG: CPT | Performed by: BEHAVIOR ANALYST

## 2024-11-25 PROCEDURE — 3075F SYST BP GE 130 - 139MM HG: CPT | Performed by: BEHAVIOR ANALYST

## 2024-11-25 RX ORDER — ALBUTEROL SULFATE 90 UG/1
1-2 INHALANT RESPIRATORY (INHALATION) EVERY 4 HOURS PRN
Qty: 8.5 G | Refills: 0 | Status: SHIPPED | OUTPATIENT
Start: 2024-11-25

## 2024-11-25 RX ORDER — BENZONATATE 100 MG/1
100 CAPSULE ORAL 3 TIMES DAILY PRN
Qty: 30 CAPSULE | Refills: 0 | Status: SHIPPED | OUTPATIENT
Start: 2024-11-25

## 2024-11-25 ASSESSMENT — PATIENT HEALTH QUESTIONNAIRE - PHQ9: CLINICAL INTERPRETATION OF PHQ2 SCORE: 0

## 2024-11-25 ASSESSMENT — FIBROSIS 4 INDEX: FIB4 SCORE: 0.88

## 2024-11-25 NOTE — PROGRESS NOTES
Chief Complaint   Patient presents with    Cough     About 9 days ago, chest and nasal congested. 99.2-99.4 fever in the last couple days.   Pressure head headache. Sore throat, from coughing         HPI: Patient is a 49 y.o. female complaining of 9 days of illness including: nocturnal, paroxysmal, and productive of gray, green sputum cough, ear pain, fever, nasal congestion. Sore throat, subjective low grade fever the last few days.   Mucus is: thick.  Similarly ill exposures: Travel at nae   Treatments tried: nasal rinse, zinc, vit C, ibuprofen,   She  reports that she has never smoked. She has never used smokeless tobacco..     ROS:  No wheezing nausea, changes in bowel movements or skin rash.      I reviewed the patient's medications, allergies and medical history:  Current Outpatient Medications   Medication Sig Dispense Refill    amoxicillin-clavulanate (AUGMENTIN) 875-125 MG Tab Take 1 Tablet by mouth 2 times a day for 5 days. 10 Tablet 0    albuterol (PROAIR HFA) 108 (90 Base) MCG/ACT Aero Soln inhalation aerosol Inhale 1-2 Puffs every four hours as needed for Shortness of Breath. 8.5 g 0    cyanocobalamin (VITAMIN B-12) 100 MCG Tab Take 100 mcg by mouth every day.      pyridoxine (VITAMIN B-6) 50 MG Tab Take 50 mg by mouth every day.      COD LIVER OIL PO Take  by mouth.      NON SPECIFIED 2 times a day. Floradix Iron Tablets      Ascorbic Acid (VITAMIN C PO) Take 450 mg by mouth every day.      MAGNESIUM PO Take 900 mg by mouth every evening. With dinner      progesterone (PROMETRIUM) 100 MG Cap Take 1 Capsule by mouth every day. Take for 14-18 days per cycle (Patient not taking: Reported on 11/25/2024) 18 Capsule 11    Cholecalciferol (VITAMIN D3) 1.25 MG (00711 UT) Cap Take 50 mcg by mouth every day.       No current facility-administered medications for this visit.     Codeine and Vicodin [hydrocodone-acetaminophen]  Past Medical History:   Diagnosis Date    Anemia     Bilateral dry eyes     COVID-19  "virus infection 05/17/2022    Positive home test with symptoms 5/17/2022    Depression     History of 2019 novel coronavirus disease (COVID-19) 03/10/2023    5/2022    History of anemia     per patient: \"Last blood test was good\"    Psychiatric problem     Anxiety    Seasonal allergies     Vitamin D deficiency         EXAM:  /74 (BP Location: Right arm, Patient Position: Sitting, BP Cuff Size: Adult)   Pulse 82   Temp 36.3 °C (97.3 °F) (Temporal)   Ht 1.651 m (5' 5\")   Wt 62.5 kg (137 lb 14.4 oz)   SpO2 94%   General: Alert, no conversational dyspnea or audible wheeze, non-toxic appearance.  Eyes: PERRL, conjunctiva slightly injected, no eye discharge.  Ears: Normal pinnae,TM's normal bilaterally.  Throat: Erythematous injection without exudate.   Neck: Supple, with no adenopathy.  Lungs: Clear to auscultation bilaterally, no wheeze, crackles or rhonchi.   Heart: Regular rate without murmur.  Skin: Warm and dry without rash.     ASSESSMENT:     1. Sinusitis, unspecified chronicity, unspecified location  amoxicillin-clavulanate (AUGMENTIN) 875-125 MG Tab    albuterol (PROAIR HFA) 108 (90 Base) MCG/ACT Aero Soln inhalation aerosol      2. Acute URI  amoxicillin-clavulanate (AUGMENTIN) 875-125 MG Tab    albuterol (PROAIR HFA) 108 (90 Base) MCG/ACT Aero Soln inhalation aerosol    benzonatate (TESSALON) 100 MG Cap             PLAN:  1. As symptoms have been worsening over the last week, will treat with antibiotics.  2. Twice daily use of nasal saline rinse or Neti-Pot plus flonase.  3. OTC anti-pyretics and decongestants as needed.  4. Follow-up in office or urgent care for worsening symptoms, difficulty breathing, lack of expected recovery, or should new symptoms or problems arise.    "

## 2024-11-25 NOTE — PATIENT INSTRUCTIONS
Effective treatments for an upper respiratory infection:    - Use a humidifier, especially at night. Cold or warm water humidifiers have the same effect.  - Edis Med squeeze bottle sinus rinses twice a day + flonase.  - Hot tea + honey + fresh lemon juice  - Throat Coat herbal tea  - Honey by itself has been shown to help fight bugs and provide cough relief  - Lots of fluids.     Medications Dose Side effects/precautions Effect   Antihistamine: Claritin/loratadine, Zyrtec/cetirizine      Decongestants: mild-phenylephrine like sudafed PE  More potent:- Pseudoephedrine like claritin D          Coricidin HBP          Guaifenesin/  Mucinex        Dextromethorphan Varies         Pseudoephedrine more likely to cause side effects of anxiety and jitteriness. **Not safe for those with high blood pressure or other heart problems.     Safe for those with high blood pressure and heart problems.              Safe for those with high blood pressure and heart problems. Improves nasal congestion & runny nose      Improves congestions                   Improves cold/flu symptoms        Thin mucus, assists with productivity of cough    Cough suppressant     NSAIDs (ibuprofen, advil, motrin, aleve, etc)      Tylenol/Acetominophen Varies          Not to exceed 2500-4000mg per day from all sources Can raise blood pressure. Not safe for those with kidney disease.     Caution with liver disease. Improves sneezing, headache, ear pain, muscle pain, joint pain   Zinc acetate or gluconate 80-92 mg per day Start within 3 days & continue as long as symptoms persist Can shorten duration of symptoms

## 2025-01-28 ENCOUNTER — APPOINTMENT (OUTPATIENT)
Dept: URBAN - METROPOLITAN AREA CLINIC 6 | Facility: CLINIC | Age: 50
Setting detail: DERMATOLOGY
End: 2025-01-28

## 2025-01-28 DIAGNOSIS — L91.8 OTHER HYPERTROPHIC DISORDERS OF THE SKIN: ICD-10-CM

## 2025-01-28 DIAGNOSIS — L81.4 OTHER MELANIN HYPERPIGMENTATION: ICD-10-CM

## 2025-01-28 DIAGNOSIS — D22 MELANOCYTIC NEVI: ICD-10-CM

## 2025-01-28 DIAGNOSIS — L82.1 OTHER SEBORRHEIC KERATOSIS: ICD-10-CM

## 2025-01-28 DIAGNOSIS — L81.5 LEUKODERMA, NOT ELSEWHERE CLASSIFIED: ICD-10-CM

## 2025-01-28 DIAGNOSIS — Z71.89 OTHER SPECIFIED COUNSELING: ICD-10-CM

## 2025-01-28 DIAGNOSIS — D18.0 HEMANGIOMA: ICD-10-CM

## 2025-01-28 PROBLEM — D22.39 MELANOCYTIC NEVI OF OTHER PARTS OF FACE: Status: ACTIVE | Noted: 2025-01-28

## 2025-01-28 PROBLEM — D22.72 MELANOCYTIC NEVI OF LEFT LOWER LIMB, INCLUDING HIP: Status: ACTIVE | Noted: 2025-01-28

## 2025-01-28 PROBLEM — D48.5 NEOPLASM OF UNCERTAIN BEHAVIOR OF SKIN: Status: ACTIVE | Noted: 2025-01-28

## 2025-01-28 PROBLEM — D22.62 MELANOCYTIC NEVI OF LEFT UPPER LIMB, INCLUDING SHOULDER: Status: ACTIVE | Noted: 2025-01-28

## 2025-01-28 PROBLEM — D22.71 MELANOCYTIC NEVI OF RIGHT LOWER LIMB, INCLUDING HIP: Status: ACTIVE | Noted: 2025-01-28

## 2025-01-28 PROBLEM — D18.01 HEMANGIOMA OF SKIN AND SUBCUTANEOUS TISSUE: Status: ACTIVE | Noted: 2025-01-28

## 2025-01-28 PROBLEM — D22.61 MELANOCYTIC NEVI OF RIGHT UPPER LIMB, INCLUDING SHOULDER: Status: ACTIVE | Noted: 2025-01-28

## 2025-01-28 PROBLEM — D22.5 MELANOCYTIC NEVI OF TRUNK: Status: ACTIVE | Noted: 2025-01-28

## 2025-01-28 PROCEDURE — ? COUNSELING

## 2025-01-28 PROCEDURE — ? SUNSCREEN TREATMENT REGIMEN

## 2025-01-28 PROCEDURE — 99203 OFFICE O/P NEW LOW 30 MIN: CPT | Mod: 25

## 2025-01-28 PROCEDURE — 11102 TANGNTL BX SKIN SINGLE LES: CPT

## 2025-01-28 PROCEDURE — ? BENIGN DESTRUCTION COSMETIC

## 2025-01-28 PROCEDURE — ? BIOPSY BY SHAVE METHOD

## 2025-01-28 ASSESSMENT — LOCATION SIMPLE DESCRIPTION DERM
LOCATION SIMPLE: LEFT THIGH
LOCATION SIMPLE: RIGHT CALF
LOCATION SIMPLE: LEFT ANTERIOR NECK
LOCATION SIMPLE: LEFT FOREHEAD
LOCATION SIMPLE: LEFT UPPER BACK
LOCATION SIMPLE: LEFT FOREARM
LOCATION SIMPLE: ABDOMEN
LOCATION SIMPLE: INFERIOR FOREHEAD
LOCATION SIMPLE: RIGHT UPPER ARM
LOCATION SIMPLE: LEFT UPPER ARM
LOCATION SIMPLE: RIGHT FOREHEAD
LOCATION SIMPLE: UPPER BACK
LOCATION SIMPLE: RIGHT FOREARM
LOCATION SIMPLE: LEFT POSTERIOR UPPER ARM
LOCATION SIMPLE: RIGHT THIGH
LOCATION SIMPLE: RIGHT POSTERIOR UPPER ARM
LOCATION SIMPLE: CHEST

## 2025-01-28 ASSESSMENT — LOCATION DETAILED DESCRIPTION DERM
LOCATION DETAILED: RIGHT DISTAL CALF
LOCATION DETAILED: LEFT INFERIOR MEDIAL FOREHEAD
LOCATION DETAILED: RIGHT PROXIMAL DORSAL FOREARM
LOCATION DETAILED: LEFT SUPERIOR MEDIAL UPPER BACK
LOCATION DETAILED: LEFT MEDIAL UPPER BACK
LOCATION DETAILED: MIDDLE STERNUM
LOCATION DETAILED: LEFT INFERIOR LATERAL NECK
LOCATION DETAILED: RIGHT DISTAL POSTERIOR UPPER ARM
LOCATION DETAILED: INFERIOR THORACIC SPINE
LOCATION DETAILED: LEFT ANTERIOR PROXIMAL THIGH
LOCATION DETAILED: LEFT PROXIMAL DORSAL FOREARM
LOCATION DETAILED: EPIGASTRIC SKIN
LOCATION DETAILED: SUPERIOR THORACIC SPINE
LOCATION DETAILED: INFERIOR MID FOREHEAD
LOCATION DETAILED: LOWER STERNUM
LOCATION DETAILED: RIGHT MEDIAL FOREHEAD
LOCATION DETAILED: RIGHT ANTERIOR DISTAL UPPER ARM
LOCATION DETAILED: LEFT ANTERIOR DISTAL UPPER ARM
LOCATION DETAILED: RIGHT ANTERIOR PROXIMAL THIGH
LOCATION DETAILED: LEFT DISTAL POSTERIOR UPPER ARM

## 2025-01-28 ASSESSMENT — LOCATION ZONE DERM
LOCATION ZONE: ARM
LOCATION ZONE: TRUNK
LOCATION ZONE: LEG
LOCATION ZONE: NECK
LOCATION ZONE: FACE

## 2025-06-12 ENCOUNTER — OFFICE VISIT (OUTPATIENT)
Dept: URGENT CARE | Facility: CLINIC | Age: 50
End: 2025-06-12
Payer: COMMERCIAL

## 2025-06-12 VITALS
DIASTOLIC BLOOD PRESSURE: 62 MMHG | HEIGHT: 65 IN | RESPIRATION RATE: 16 BRPM | SYSTOLIC BLOOD PRESSURE: 110 MMHG | TEMPERATURE: 97.2 F | HEART RATE: 56 BPM | OXYGEN SATURATION: 99 % | WEIGHT: 134 LBS | BODY MASS INDEX: 22.33 KG/M2

## 2025-06-12 DIAGNOSIS — H01.001 BLEPHARITIS OF RIGHT UPPER EYELID, UNSPECIFIED TYPE: Primary | ICD-10-CM

## 2025-06-12 PROCEDURE — 3078F DIAST BP <80 MM HG: CPT

## 2025-06-12 PROCEDURE — 3074F SYST BP LT 130 MM HG: CPT

## 2025-06-12 PROCEDURE — 99213 OFFICE O/P EST LOW 20 MIN: CPT

## 2025-06-12 RX ORDER — ERYTHROMYCIN 5 MG/G
1 OINTMENT OPHTHALMIC 4 TIMES DAILY
Qty: 1 G | Refills: 0 | Status: SHIPPED | OUTPATIENT
Start: 2025-06-12 | End: 2025-06-19

## 2025-06-12 ASSESSMENT — ENCOUNTER SYMPTOMS
BLURRED VISION: 0
CHILLS: 0
EYE DISCHARGE: 0
DOUBLE VISION: 0
PHOTOPHOBIA: 0
EYE PAIN: 0
FEVER: 0
EYE REDNESS: 0

## 2025-06-12 ASSESSMENT — FIBROSIS 4 INDEX: FIB4 SCORE: 0.9

## 2025-06-12 NOTE — PROGRESS NOTES
CHIEF COMPLAINT  Chief Complaint   Patient presents with    Eye Pain     Right eye swollen and irritated x yesterday      Subjective:   Hilary Santos is a 50 y.o. female who presents to urgent care with concerns for right upper eyelid swelling and irritation x 1 day.  Patient reports that she often experiences irritation and watering from her right eye, which has been chronic over the last several years.  She reports yesterday noting increased irritation which prompted her to rub and scratch her eye more frequently.  She reports waking up this morning with mild swelling and redness to her upper lash line.  She denies any abnormal drainage.  No acute vision changes.  Denies contact use.  She reports attempting to alleviate symptoms with Benadryl, allergy eye drops and Motrin.        Review of Systems   Constitutional:  Negative for chills and fever.   Eyes:  Negative for blurred vision, double vision, photophobia, pain, discharge and redness.       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    ALLERGIES  Allergies[1]    CURRENT MEDICATIONS  Home  "Medications       Reviewed by Maritza Merida (Medical Assistant) on 25 at 0933  Med List Status: <None>     Medication Last Dose Status   albuterol (PROAIR HFA) 108 (90 Base) MCG/ACT Aero Soln inhalation aerosol  Active   Ascorbic Acid (VITAMIN C PO)  Active   benzonatate (TESSALON) 100 MG Cap  Active   Cholecalciferol (VITAMIN D3) 1.25 MG (76754 UT) Cap  Active   COD LIVER OIL PO  Active   cyanocobalamin (VITAMIN B-12) 100 MCG Tab  Active   MAGNESIUM PO  Active   NON SPECIFIED  Active   progesterone (PROMETRIUM) 100 MG Cap  Active   pyridoxine (VITAMIN B-6) 50 MG Tab  Active                    SOCIAL HISTORY  Social History     Tobacco Use    Smoking status: Never    Smokeless tobacco: Never   Vaping Use    Vaping status: Never Used   Substance and Sexual Activity    Alcohol use: Yes     Alcohol/week: 2.4 oz     Types: 4 Standard drinks or equivalent per week     Comment: 3/week    Drug use: No    Sexual activity: Yes     Partners: Male       FAMILY HISTORY  Family History   Problem Relation Age of Onset    Hypertension Mother         aneurysm 51, did not pass    Diabetes Mother     Hypertension Father         stroke 52,     Cancer Maternal Grandmother         breast 60s         Medications, Allergies, and current problem list reviewed today in Epic.     Objective:     /62 (BP Location: Left arm, Patient Position: Sitting, BP Cuff Size: Adult)   Pulse (!) 56   Temp 36.2 °C (97.2 °F) (Temporal)   Resp 16   Ht 1.651 m (5' 5\")   Wt 60.8 kg (134 lb)   SpO2 99%     Physical Exam  Vitals reviewed.   Constitutional:       General: She is not in acute distress.     Appearance: Normal appearance. She is not ill-appearing or toxic-appearing.   HENT:      Head: Normocephalic.      Nose: Nose normal.   Eyes:      General: Gaze aligned appropriately.         Right eye: No discharge or hordeolum.      Conjunctiva/sclera:      Right eye: Right conjunctiva is not injected.      Pupils: Pupils are " equal, round, and reactive to light.     Cardiovascular:      Rate and Rhythm: Normal rate.      Pulses: Normal pulses.   Pulmonary:      Effort: Pulmonary effort is normal.   Neurological:      Mental Status: She is alert.         Assessment/Plan:     Diagnosis and associated orders:     1. Blepharitis of right upper eyelid, unspecified type  erythromycin 5 MG/GM Ointment         Comments/MDM:     History and physical exam consistent with blepharitis of right upper eyelid.  Erythromycin ointment sent to preferred pharmacy, counseled on appropriate medication administration.  Discussed use of clean warm compress and ocular lens lid wipes.  Advised on use of Pataday as needed.  Follow-up with ophthalmology regarding chronic watery eyes.  Return to clinic if symptoms worsen or fail to resolve.  Patient comfortable plan.         Differential diagnosis, natural history, supportive care, and indications for immediate follow-up discussed.    Advised the patient to follow-up with the primary care physician for recheck, reevaluation, and consideration of further management.    Please note that this dictation was created using voice recognition software. I have made a reasonable attempt to correct obvious errors, but I expect that there are errors of grammar and possibly content that I did not discover before finalizing the note.    This note was electronically signed by ARYAN Duran       [1]   Allergies  Allergen Reactions    Codeine Nausea    Vicodin [Hydrocodone-Acetaminophen] Nausea

## 2025-06-26 ENCOUNTER — OFFICE VISIT (OUTPATIENT)
Dept: MEDICAL GROUP | Facility: PHYSICIAN GROUP | Age: 50
End: 2025-06-26
Payer: COMMERCIAL

## 2025-06-26 VITALS
DIASTOLIC BLOOD PRESSURE: 66 MMHG | WEIGHT: 134 LBS | TEMPERATURE: 97.4 F | SYSTOLIC BLOOD PRESSURE: 112 MMHG | BODY MASS INDEX: 22.33 KG/M2 | OXYGEN SATURATION: 99 % | HEIGHT: 65 IN | HEART RATE: 62 BPM

## 2025-06-26 DIAGNOSIS — H01.001 BLEPHARITIS OF RIGHT UPPER EYELID, UNSPECIFIED TYPE: Primary | ICD-10-CM

## 2025-06-26 DIAGNOSIS — H04.123 DRY EYE SYNDROME OF BOTH EYES: ICD-10-CM

## 2025-06-26 PROCEDURE — 99214 OFFICE O/P EST MOD 30 MIN: CPT | Performed by: NURSE PRACTITIONER

## 2025-06-26 PROCEDURE — 3074F SYST BP LT 130 MM HG: CPT | Performed by: NURSE PRACTITIONER

## 2025-06-26 PROCEDURE — 3078F DIAST BP <80 MM HG: CPT | Performed by: NURSE PRACTITIONER

## 2025-06-26 RX ORDER — TOBRAMYCIN AND DEXAMETHASONE 3; 1 MG/ML; MG/ML
1 SUSPENSION/ DROPS OPHTHALMIC
Qty: 2.5 ML | Refills: 0 | Status: CANCELLED | OUTPATIENT
Start: 2025-06-26 | End: 2025-07-03

## 2025-06-26 ASSESSMENT — FIBROSIS 4 INDEX: FIB4 SCORE: 0.9

## 2025-06-26 NOTE — PROGRESS NOTES
Verbal consent was acquired by the patient to use [employer] ambient listening note generation during this visit yes    Subjective:     HPI:   History of Present Illness  The patient is a 50-year-old female who presents today to discuss a recurrence of blepharitis.    Blepharitis  She reports that her symptoms began yesterday morning, with the right eyelid exhibiting significant redness and swelling. The area under her eyes is also red, but not swollen. She describes a progressive worsening of symptoms, including itching, burning, and a sensation of pressure in her eye. Ibuprofen provided significant relief from the itching and pain, but a half dose of Benadryl taken last night seemed to exacerbate her symptoms this morning. She recalls a similar reaction to an acne cream she used as a teenager. She has not used the under-eye cream for several weeks but applied it last night. She suspects that the blepharitis may not have fully resolved and that the use of the under-eye cream exacerbated the condition. She reports no blurred vision, spots in her vision, floaters, or sparkles. Her sclera appears white. She reports no excessive eyelash loss or presence of bumps. She reports no significant drainage from her eyes. She notes that her symptoms were worse this morning than they were yesterday.  - Onset: Symptoms began yesterday morning.  - Location: Right eyelid and area under her eyes.  - Duration: Since yesterday morning.  - Character: Redness, swelling, itching, burning, sensation of pressure, no blurred vision, spots, floaters, sparkles, excessive eyelash loss, bumps, or significant drainage.  - Alleviating/Aggravating Factors: Ibuprofen provided relief; Benadryl exacerbated symptoms; under-eye cream may have exacerbated condition.  - Timing: Symptoms progressively worsened; worse this morning than yesterday.  - Severity: Significant redness and swelling, worse this morning.    She mentions that she was advised by  "urgent care to see an ophthalmologist but requires a referral. She also reports severe dry eyes and believes she may need to see an ophthalmologist for this issue. She notes that her symptoms are always worse on one side. She reports that her allergies worsen during allergy season, leading to increased eye watering. She reports that the erythromycin ointment initially helped with the inflammation, but the condition gradually worsened. She has been using an antibiotic, which initially seemed effective but later caused a burning sensation. She has also been using antihistamine and redness relief eyedrops, as well as an over-the-counter eye scrub. On Tuesday, she noticed an increase in thick, clear discharge from her eye, which was more tenacious than usual and accompanied by crusting. She uses eyedrops twice daily, which have led to a jackelyn eye sensation and dryness.    Recent Camping Trip  She mentions a recent camping trip where she experienced fever, chills, cough, and a mild sore throat, which resolved spontaneously.    Health Maintenance: Completed    Objective:     Exam:  /66 (BP Location: Left arm, Patient Position: Sitting, BP Cuff Size: Adult)   Pulse 62   Temp 36.3 °C (97.4 °F) (Temporal)   Ht 1.651 m (5' 5\")   Wt 60.8 kg (134 lb)   SpO2 99%   BMI 22.30 kg/m²  Body mass index is 22.3 kg/m².    Physical Exam  Constitutional:       Appearance: Normal appearance.   HENT:      Head: Normocephalic and atraumatic.   Cardiovascular:      Rate and Rhythm: Normal rate and regular rhythm.      Pulses: Normal pulses.      Heart sounds: Normal heart sounds.   Pulmonary:      Effort: Pulmonary effort is normal.      Breath sounds: Normal breath sounds.   Skin:     General: Skin is warm and dry.   Neurological:      General: No focal deficit present.      Mental Status: She is alert and oriented to person, place, and time.         Results      Assessment & Plan:     1. Blepharitis of right upper eyelid, " unspecified type  Referral to Ophthalmology    dexamethasone (DECADRON) 0.1 % ophthalmic suspension      2. Dry eye syndrome of both eyes  Referral to Ophthalmology          Assessment & Plan  1. Recurrent blepharitis: Acute inflammatory reaction, likely contact dermatitis type.  - Prescription for dexamethasone eyedrops, to be used twice daily for 2 to 7 days. Discontinue if symptoms resolve before completing the course.  - Recommendation to use over-the-counter antihistamine eyedrops such as ketotifen fumarate (Zaditor), avoiding formulations with redness relief additives.  - Advised to maintain a time gap between the use of dexamethasone and antihistamine eyedrops.  - Referral to ophthalmology for further evaluation.    2.  Dry eyes  - artificial tears    Follow-up  - Referral to ophthalmology for further evaluation.          Return in about 1 month (around 7/26/2025), or if symptoms worsen or fail to improve.    I have placed the below orders and discussed them with an approved delegating provider. The MA is performing the below orders under the direction of Dr. Meade.      Please note that this dictation was created using voice recognition software. I have made every reasonable attempt to correct obvious errors, but I expect that there are errors of grammar and possibly content that I did not discover before finalizing the note.

## 2025-07-07 NOTE — Clinical Note
Member Name: Hilary Santos   Member Number: [id number]   Reference Number: [id number]   Approved Services: Consultation   Approved Service Dates: 06/26/2025 - 06/26/2026   Requesting Provider: Patrick Judd   Requested Provider: [facility]     Dear Hilary Santos:     The following medical service(s) requested by Patrick Judd have been approved:    Procedure Code Procedure Code Name Requested Quantity Approved Quantity Status   63603 (CPT®) ID OFFICE/OUTPATIENT NEW MODERATE MDM 45 MINUTES 1 1 Authorized   20224 (CPT®) ID OFFICE/OUTPATIENT ESTABLISHED MOD MDM 30 MIN 5 5 Authorized       Approved Quantity means the number of visits approved for medication treatments and/or medical services.    The services should be provided by [facility] no later than 06/26/2026. Please contact the provider listed below with any questions.     Provider Information:  [facility]  [phone]    Your plan benefit may require a deductible, co-payment or coinsurance for these services. This authorization does not guarantee [facility] will pay the claim for services that you receive. Payment by [facility] for these services is subject to the terms of your Evidence of Coverage or Summary Plan Description, your eligibility at the time of service, and confirmation of benefit coverage.    For any questions or additional information, please contact Customer Service:    [facility]  Customer Service: [phone] or toll free [phone]  TTY users dial: [phone]   Call Center Hours: Mon - Fri 7 AM to 8 PM PST   Office Hours: Mon - Fri 8 AM to 5 PM [facility]   E-mail: [email or web address]   Website: [email or web address]       This information is available for free in other languages. Please contact Customer Service at the phone number above for more information. [facility] complies with applicable Federal civil rights laws and does not discriminate on the  basis of race, color, national origin, age, disability or sex.      Sincerely,     Healthcare Utilization Management Department     Cc: [facility]   Patrick Judd

## 2025-07-07 NOTE — Clinical Note
REFERRAL APPROVAL NOTICE         Sent on July 7, 2025                   Hilary Santos  [address] [place] [address]                   Dear Ms. Liuner,    After a careful review of the medical information and benefit coverage, [facility] has processed your referral. See below for additional details.    If applicable, you must be actively enrolled with your insurance for coverage of the authorized service. If you have any questions regarding your coverage, please contact your insurance directly.    REFERRAL INFORMATION   Referral #:  [id number]  Referred-To Provider    Referred-By Provider:  Ophthalmology    ARYAN Morgan   [facility]      [address]  [place] [address]  [phone] [address] [place] [address]  [phone]    Referral Start Date:  06/26/2025  Referral End Date:   06/26/2026             SCHEDULING  If you do not already have an appointment, please call [phone] to make an appointment.     MORE INFORMATION  If you do not already have a [employer] account, sign up at: [email or web address]  You can access your medical information, make appointments, see lab results, billing information, and more.  If you have questions regarding this referral, please contact  the [facility] Referrals department at:             [phone]. Monday - Friday 8:00AM - 5:00PM.     Sincerely,    [facility]

## (undated) DEVICE — MANIFOLD NEPTUNE 1 PORT (20/PK)

## (undated) DEVICE — GOWN WARMING STANDARD FLEX - (30/CA)

## (undated) DEVICE — ELECTRODE DUAL RETURN W/ CORD - (50/PK)

## (undated) DEVICE — KIT ANESTHESIA W/CIRCUIT & 3/LT BAG W/FILTER (20EA/CA)

## (undated) DEVICE — PROTECTOR ULNA NERVE - (36PR/CA)

## (undated) DEVICE — TUBING CLEARLINK DUO-VENT - C-FLO (48EA/CA)

## (undated) DEVICE — WATER IRRIGATION STERILE 1000ML (12EA/CA)

## (undated) DEVICE — MASK ANESTHESIA ADULT  - (100/CA)

## (undated) DEVICE — TUBE CONNECTING SUCTION - CLEAR PLASTIC STERILE 72 IN (50EA/CA)

## (undated) DEVICE — CATHETER IV 20 GA X 1-1/4 ---SURG.& SDS ONLY--- (50EA/BX)

## (undated) DEVICE — SLEEVE, VASO, THIGH, MED

## (undated) DEVICE — NEPTUNE 4 PORT MANIFOLD - (20/PK)

## (undated) DEVICE — SUTURE GENERAL

## (undated) DEVICE — DRAPE UNDER BUTTOCKS FLUID - (20/CA)

## (undated) DEVICE — TOWEL STOP TIMEOUT SAFETY FLAG (40EA/CA)

## (undated) DEVICE — TUBING OUTFLOW HYSTEROSCPY (10EA/BX)

## (undated) DEVICE — LACTATED RINGERS INJ 1000 ML - (14EA/CA 60CA/PF)

## (undated) DEVICE — GLOVE BIOGEL SZ 6.5 SURGICAL PF LTX (50PR/BX 4BX/CA)

## (undated) DEVICE — SUCTION INSTRUMENT YANKAUER BULBOUS TIP W/O VENT (50EA/CA)

## (undated) DEVICE — TUBING INFLOW HYSTEROSCOPY (10EA/CA)

## (undated) DEVICE — MASK AIRWAY SIZE 3 UNIQUE SILICON (10/BX)

## (undated) DEVICE — ELECTRODE MONOPOLAR ANGLED CUTTING LOOP DIAM 0.35 YELLOW 24FR (6EA/PK)

## (undated) DEVICE — SOLUTION SORBITOL 3000ML (4/CA)

## (undated) DEVICE — SENSOR SPO2 NEO LNCS ADHESIVE (20/BX) SEE USER NOTES

## (undated) DEVICE — TRAY SRGPRP PVP IOD WT PRP - (20/CA)

## (undated) DEVICE — Device

## (undated) DEVICE — CANISTER SUCTION RIGID RED 1500CC (40EA/CA)

## (undated) DEVICE — SODIUM CHL IRRIGATION 0.9% 1000ML (12EA/CA)

## (undated) DEVICE — HEAD HOLDER JUNIOR/ADULT

## (undated) DEVICE — NEEDLE SPINAL NON-SAFETY 22 GA X 3 (25EA/BX)"

## (undated) DEVICE — PAD SANITARY 11IN MAXI IND WRAPPED  (12EA/PK 24PK/CA)

## (undated) DEVICE — KIT  I.V. START (100EA/CA)

## (undated) DEVICE — CANISTER SUCTION 3000ML MECHANICAL FILTER AUTO SHUTOFF MEDI-VAC NONSTERILE LF DISP  (40EA/CA)

## (undated) DEVICE — ELECTRODE 850 FOAM ADHESIVE - HYDROGEL RADIOTRNSPRNT (50/PK)

## (undated) DEVICE — SET LEADWIRE 5 LEAD BEDSIDE DISPOSABLE ECG (1SET OF 5/EA)